# Patient Record
Sex: FEMALE | Race: BLACK OR AFRICAN AMERICAN | Employment: OTHER | URBAN - METROPOLITAN AREA
[De-identification: names, ages, dates, MRNs, and addresses within clinical notes are randomized per-mention and may not be internally consistent; named-entity substitution may affect disease eponyms.]

---

## 2017-10-30 ENCOUNTER — TELEPHONE (OUTPATIENT)
Dept: FAMILY MEDICINE CLINIC | Age: 77
End: 2017-10-30

## 2017-10-30 NOTE — TELEPHONE ENCOUNTER
[address] [facility]    PT was discharged from [facility] for BP  Wants HFU appt, nothing available    PT last seen 5/17/11    Please advise Marylen Basta

## 2017-10-31 ENCOUNTER — TELEPHONE (OUTPATIENT)
Dept: FAMILY MEDICINE CLINIC | Age: 77
End: 2017-10-31

## 2017-10-31 NOTE — TELEPHONE ENCOUNTER
Daughter Katty Biswas is calling for the 3rd time wanting to get her mother in to see Dr. Osvaldo Sosa. Her mother was in the hospital Saturday, they ran some test on her. She was told to get with her doctor ASAP, her blood pressure is high and she is having shortness of breath. She did state if we cant get her in to let her know she will have to find her mother a different doctor. She is really worried about her mother.     Please advise  Kamala Nunez [phone]      Last Office Visit: 5/28/14

## 2017-11-01 ENCOUNTER — OFFICE VISIT (OUTPATIENT)
Dept: FAMILY MEDICINE CLINIC | Age: 77
End: 2017-11-01

## 2017-11-01 VITALS
HEIGHT: 65 IN | DIASTOLIC BLOOD PRESSURE: 80 MMHG | SYSTOLIC BLOOD PRESSURE: 120 MMHG | BODY MASS INDEX: 34.67 KG/M2 | OXYGEN SATURATION: 95 % | HEART RATE: 104 BPM | WEIGHT: 208.1 LBS

## 2017-11-01 DIAGNOSIS — R39.15 URINARY URGENCY: ICD-10-CM

## 2017-11-01 DIAGNOSIS — R06.02 SOB (SHORTNESS OF BREATH): ICD-10-CM

## 2017-11-01 DIAGNOSIS — R35.0 URINARY FREQUENCY: ICD-10-CM

## 2017-11-01 PROCEDURE — 99213 OFFICE O/P EST LOW 20 MIN: CPT | Performed by: FAMILY MEDICINE

## 2017-11-01 RX ORDER — OXYBUTYNIN CHLORIDE 5 MG/1
5 TABLET, EXTENDED RELEASE ORAL DAILY
Qty: 30 TABLET | Refills: 3 | Status: SHIPPED | OUTPATIENT
Start: 2017-11-01 | End: 2020-03-27

## 2017-11-01 ASSESSMENT — ENCOUNTER SYMPTOMS
WHEEZING: 0
ABDOMINAL PAIN: 0
SHORTNESS OF BREATH: 1
HEMOPTYSIS: 0
RHINORRHEA: 0
VOMITING: 0
SPUTUM PRODUCTION: 0
ORTHOPNEA: 0
SORE THROAT: 0
SWOLLEN GLANDS: 0

## 2017-11-01 ASSESSMENT — PATIENT HEALTH QUESTIONNAIRE - PHQ9
2. FEELING DOWN, DEPRESSED OR HOPELESS: 0
SUM OF ALL RESPONSES TO PHQ QUESTIONS 1-9: 0
1. LITTLE INTEREST OR PLEASURE IN DOING THINGS: 0
SUM OF ALL RESPONSES TO PHQ9 QUESTIONS 1 & 2: 0

## 2017-11-03 LAB — URINE CULTURE, ROUTINE: NORMAL

## 2017-12-26 ENCOUNTER — TELEPHONE (OUTPATIENT)
Dept: FAMILY MEDICINE CLINIC | Age: 77
End: 2017-12-26

## 2017-12-26 NOTE — TELEPHONE ENCOUNTER
I   [phone]    Fax [phone]    Requesting H&P, most recent labs  Surgery is on Thursday    PT last seen 11/1/17

## 2020-03-27 ENCOUNTER — OFFICE VISIT (OUTPATIENT)
Dept: FAMILY MEDICINE CLINIC | Age: 80
End: 2020-03-27
Payer: MEDICARE

## 2020-03-27 ENCOUNTER — TELEPHONE (OUTPATIENT)
Dept: FAMILY MEDICINE CLINIC | Age: 80
End: 2020-03-27

## 2020-03-27 VITALS
DIASTOLIC BLOOD PRESSURE: 84 MMHG | TEMPERATURE: 98.7 F | SYSTOLIC BLOOD PRESSURE: 132 MMHG | HEART RATE: 111 BPM | OXYGEN SATURATION: 95 % | BODY MASS INDEX: 33.48 KG/M2 | WEIGHT: 201.2 LBS

## 2020-03-27 DIAGNOSIS — R53.83 FATIGUE, UNSPECIFIED TYPE: ICD-10-CM

## 2020-03-27 DIAGNOSIS — R22.1 NECK MASS: ICD-10-CM

## 2020-03-27 PROBLEM — N30.91 CYSTITIS WITH HEMATURIA: Status: ACTIVE | Noted: 2020-03-27

## 2020-03-27 PROBLEM — E07.9 THYROID MASS: Status: ACTIVE | Noted: 2020-03-27

## 2020-03-27 LAB
A/G RATIO: 1.1 (ref 1.1–2.2)
ALBUMIN SERPL-MCNC: 3.9 G/DL (ref 3.4–5)
ALP BLD-CCNC: 90 U/L (ref 40–129)
ALT SERPL-CCNC: 10 U/L (ref 10–40)
ANION GAP SERPL CALCULATED.3IONS-SCNC: 14 MMOL/L (ref 3–16)
AST SERPL-CCNC: 17 U/L (ref 15–37)
BASOPHILS ABSOLUTE: 0 K/UL (ref 0–0.2)
BASOPHILS RELATIVE PERCENT: 0.6 %
BILIRUB SERPL-MCNC: 0.5 MG/DL (ref 0–1)
BUN BLDV-MCNC: 14 MG/DL (ref 7–20)
CALCIUM SERPL-MCNC: 9.2 MG/DL (ref 8.3–10.6)
CHLORIDE BLD-SCNC: 103 MMOL/L (ref 99–110)
CO2: 22 MMOL/L (ref 21–32)
CREAT SERPL-MCNC: 1.2 MG/DL (ref 0.6–1.2)
EOSINOPHILS ABSOLUTE: 0.2 K/UL (ref 0–0.6)
EOSINOPHILS RELATIVE PERCENT: 3.1 %
GFR AFRICAN AMERICAN: 52
GFR NON-AFRICAN AMERICAN: 43
GLOBULIN: 3.7 G/DL
GLUCOSE BLD-MCNC: 93 MG/DL (ref 70–99)
HCT VFR BLD CALC: 40 % (ref 36–48)
HEMOGLOBIN: 13 G/DL (ref 12–16)
LYMPHOCYTES ABSOLUTE: 2.1 K/UL (ref 1–5.1)
LYMPHOCYTES RELATIVE PERCENT: 36.7 %
MCH RBC QN AUTO: 29.6 PG (ref 26–34)
MCHC RBC AUTO-ENTMCNC: 32.6 G/DL (ref 31–36)
MCV RBC AUTO: 91 FL (ref 80–100)
MONOCYTES ABSOLUTE: 0.6 K/UL (ref 0–1.3)
MONOCYTES RELATIVE PERCENT: 10.4 %
NEUTROPHILS ABSOLUTE: 2.8 K/UL (ref 1.7–7.7)
NEUTROPHILS RELATIVE PERCENT: 49.2 %
PDW BLD-RTO: 13.7 % (ref 12.4–15.4)
PLATELET # BLD: 247 K/UL (ref 135–450)
PMV BLD AUTO: 8.8 FL (ref 5–10.5)
POTASSIUM SERPL-SCNC: 4.4 MMOL/L (ref 3.5–5.1)
RBC # BLD: 4.4 M/UL (ref 4–5.2)
SODIUM BLD-SCNC: 139 MMOL/L (ref 136–145)
T4 FREE: 1.2 NG/DL (ref 0.9–1.8)
TOTAL PROTEIN: 7.6 G/DL (ref 6.4–8.2)
TSH SERPL DL<=0.05 MIU/L-ACNC: 2.8 UIU/ML (ref 0.27–4.2)
WBC # BLD: 5.7 K/UL (ref 4–11)

## 2020-03-27 PROCEDURE — 81000 URINALYSIS NONAUTO W/SCOPE: CPT | Performed by: FAMILY MEDICINE

## 2020-03-27 PROCEDURE — 99214 OFFICE O/P EST MOD 30 MIN: CPT | Performed by: FAMILY MEDICINE

## 2020-03-27 RX ORDER — CEPHALEXIN 500 MG/1
500 CAPSULE ORAL 3 TIMES DAILY
Qty: 21 CAPSULE | Refills: 0 | Status: SHIPPED | OUTPATIENT
Start: 2020-03-27 | End: 2020-04-03

## 2020-03-27 ASSESSMENT — ENCOUNTER SYMPTOMS
COUGH: 0
BACK PAIN: 0
ABDOMINAL DISTENTION: 0
SWOLLEN GLANDS: 0
CHANGE IN BOWEL HABIT: 0
NAUSEA: 0
SORE THROAT: 0
EYE REDNESS: 0
APNEA: 0
CHEST TIGHTNESS: 0
VISUAL CHANGE: 0
PHOTOPHOBIA: 0
VOICE CHANGE: 0
VOMITING: 0
STRIDOR: 0
SINUS PRESSURE: 0
ANAL BLEEDING: 0
COLOR CHANGE: 0
ABDOMINAL PAIN: 0
CONSTIPATION: 0
RECTAL PAIN: 0
FACIAL SWELLING: 0
DIARRHEA: 0
EYE ITCHING: 0
EYE DISCHARGE: 0
RHINORRHEA: 0
TROUBLE SWALLOWING: 0
WHEEZING: 0
SHORTNESS OF BREATH: 0
CHOKING: 0
BLOOD IN STOOL: 0
EYE PAIN: 0

## 2020-03-27 ASSESSMENT — PATIENT HEALTH QUESTIONNAIRE - PHQ9
SUM OF ALL RESPONSES TO PHQ9 QUESTIONS 1 & 2: 0
SUM OF ALL RESPONSES TO PHQ QUESTIONS 1-9: 0
1. LITTLE INTEREST OR PLEASURE IN DOING THINGS: 0
2. FEELING DOWN, DEPRESSED OR HOPELESS: 0
SUM OF ALL RESPONSES TO PHQ QUESTIONS 1-9: 0

## 2020-03-27 ASSESSMENT — LIFESTYLE VARIABLES: TOBACCO_USE: 0

## 2020-03-27 NOTE — PROGRESS NOTES
easily. Psychiatric/Behavioral: Negative for agitation, behavioral problems, confusion, decreased concentration, dysphoric mood, hallucinations, self-injury, sleep disturbance and suicidal ideas. The patient is not nervous/anxious and is not hyperactive. Objective:   Physical Exam  Physical Exam  Vitals signs reviewed. Constitutional:       General: She is not in acute distress. Appearance: She is well-developed. Eyes:      Conjunctiva/sclera: Conjunctivae normal.      Pupils: Pupils are equal, round, and reactive to light. Neck:      Thyroid: No thyromegaly. Vascular: No JVD. Trachea: No tracheal deviation. Comments: Mass L thyroid--no bruit    Cardiovascular:      Rate and Rhythm: Normal rate and regular rhythm. Heart sounds: Normal heart sounds. No murmur. No gallop. Pulmonary:      Effort: Pulmonary effort is normal. No respiratory distress. Breath sounds: Normal breath sounds. No stridor. No wheezing or rales. Chest:      Chest wall: No tenderness. Abdominal:      General: Bowel sounds are normal. There is no distension. Palpations: Abdomen is soft. There is no mass. Tenderness: There is abdominal tenderness (over bladder). Musculoskeletal:         General: No tenderness. Lymphadenopathy:      Cervical: No cervical adenopathy. Skin:     General: Skin is warm and dry. Coloration: Skin is not pale. Findings: No erythema or rash. Neurological:      Mental Status: She is alert and oriented to person, place, and time. Cranial Nerves: No cranial nerve deficit. Motor: No abnormal muscle tone. Coordination: Coordination normal.      Deep Tendon Reflexes: Reflexes normal.   Psychiatric:         Behavior: Behavior normal.         Thought Content: Thought content normal.         Judgment: Judgment normal.       Urine dipstick shows positive for WBC's and positive for RBC's.   Micro exam: 30 WBC's per HPF and 30 RBC's per

## 2020-03-29 LAB — URINE CULTURE, ROUTINE: NORMAL

## 2020-04-02 ENCOUNTER — TELEPHONE (OUTPATIENT)
Dept: FAMILY MEDICINE CLINIC | Age: 80
End: 2020-04-02

## 2020-04-06 NOTE — TELEPHONE ENCOUNTER
Pt knows she is supposed to have ultrasound but said she is supposed to have a differen abx sent into pharmacy as well

## 2020-04-09 ENCOUNTER — TELEPHONE (OUTPATIENT)
Dept: FAMILY MEDICINE CLINIC | Age: 80
End: 2020-04-09

## 2020-04-09 NOTE — TELEPHONE ENCOUNTER
Dr. Navi Glaser received call from central scheduling     want to know if ultra sound is urgent or not  , need approval  or can reachout to central scheduling [phone]

## 2020-04-16 ENCOUNTER — TELEPHONE (OUTPATIENT)
Dept: FAMILY MEDICINE CLINIC | Age: 80
End: 2020-04-16

## 2020-04-21 ENCOUNTER — HOSPITAL ENCOUNTER (OUTPATIENT)
Dept: ULTRASOUND IMAGING | Age: 80
Discharge: HOME | End: 2020-04-21
Payer: MEDICARE

## 2020-04-21 PROBLEM — K80.20 ASYMPTOMATIC GALLSTONES: Status: ACTIVE | Noted: 2020-04-21

## 2020-04-21 PROBLEM — N20.0 KIDNEY STONES: Status: ACTIVE | Noted: 2020-04-21

## 2020-04-21 PROCEDURE — 76830 TRANSVAGINAL US NON-OB: CPT

## 2020-04-21 PROCEDURE — 76856 US EXAM PELVIC COMPLETE: CPT

## 2020-04-21 PROCEDURE — 76700 US EXAM ABDOM COMPLETE: CPT

## 2020-12-11 ENCOUNTER — NURSE TRIAGE (OUTPATIENT)
Dept: OTHER | Facility: CLINIC | Age: 80
End: 2020-12-11

## 2020-12-11 NOTE — TELEPHONE ENCOUNTER
Called and notified pt.
Please call and refer to Dr Preeti Nava
normal activities, abdomen soft and not tender to touch     - MODERATE (4-7): interferes with normal activities or awakens from sleep, tender to touch     - SEVERE (8-10): excruciating pain, doubled over, unable to do any normal activities       No    5. BLOOD THINNERS: \"Do you take any blood thinners? \" (e.g., Coumadin/warfarin, Pradaxa/dabigatran, aspirin)      Baby asa and full strength occasionally    6. HORMONES: Helen Dominguezlow you taking any hormone medications, prescription or OTC? \" (e.g., birth control pills, estrogen)      No    7. CAUSE: \"What do you think is causing the bleeding? \" (e.g., recent gyn surgery, recent gyn procedure; known bleeding disorder, uterine cancer)        No, unknown    8. HEMODYNAMIC STATUS: \"Are you weak or feeling lightheaded? \" If so, ask: \"Can you stand and walk normally? \"        Lightheaded occasionally. 9. OTHER SYMPTOMS: \"What other symptoms are you having with the bleeding? \" (e.g., back pain, burning with urination, fever)      discharge    Protocols used: VAGINAL BLEEDING - POSTMENOPAUSAL-ADULT-

## 2021-01-12 ENCOUNTER — OFFICE VISIT (OUTPATIENT)
Dept: GYNECOLOGY | Age: 81
End: 2021-01-12
Payer: MEDICARE

## 2021-01-12 VITALS
BODY MASS INDEX: 28.99 KG/M2 | HEART RATE: 103 BPM | DIASTOLIC BLOOD PRESSURE: 83 MMHG | HEIGHT: 65 IN | SYSTOLIC BLOOD PRESSURE: 130 MMHG | TEMPERATURE: 97.9 F | WEIGHT: 174 LBS | RESPIRATION RATE: 16 BRPM

## 2021-01-12 DIAGNOSIS — Z01.419 WELL WOMAN EXAM: Primary | ICD-10-CM

## 2021-01-12 DIAGNOSIS — R31.9 HEMATURIA, UNSPECIFIED TYPE: ICD-10-CM

## 2021-01-12 DIAGNOSIS — N95.0 POSTMENOPAUSAL BLEEDING: ICD-10-CM

## 2021-01-12 DIAGNOSIS — N76.0 ACUTE VAGINITIS: ICD-10-CM

## 2021-01-12 PROCEDURE — G0101 CA SCREEN;PELVIC/BREAST EXAM: HCPCS | Performed by: OBSTETRICS & GYNECOLOGY

## 2021-01-12 PROCEDURE — 58100 BIOPSY OF UTERUS LINING: CPT | Performed by: OBSTETRICS & GYNECOLOGY

## 2021-01-12 RX ORDER — METRONIDAZOLE 500 MG/1
500 TABLET ORAL 2 TIMES DAILY
Qty: 14 TABLET | Refills: 0 | Status: SHIPPED | OUTPATIENT
Start: 2021-01-12 | End: 2021-01-19

## 2021-01-12 ASSESSMENT — ENCOUNTER SYMPTOMS
SHORTNESS OF BREATH: 0
APNEA: 0
VOMITING: 0
DIARRHEA: 0
BLOOD IN STOOL: 0
CONSTIPATION: 0
ABDOMINAL DISTENTION: 0
COLOR CHANGE: 0
PHOTOPHOBIA: 0
NAUSEA: 0
CHEST TIGHTNESS: 0
COUGH: 0
WHEEZING: 0
ABDOMINAL PAIN: 0
SORE THROAT: 0
TROUBLE SWALLOWING: 0
BACK PAIN: 0
RECTAL PAIN: 0
ANAL BLEEDING: 0

## 2021-01-12 NOTE — PROGRESS NOTES
Annual GYN Visit    Cheryl T Alexis  Date ofBirth:  1940    Date of Service:  2021    Chief Complaint:   Maritza Garcia is a 78 y.o.  female who presents for routine annual gynecologic visit and postmenopausal bleeding x 5 months     HPI:  Patient is postmenopausal- she reports history of postmenopausal bleeding x 5 months. Bleeding is daily and about one pad/day - light pink to blood tinged, she reports 25 lb weight loss recently. Pelvic ultrasound done 2020 ->   FINDINGS:       The uterus measures 11.4 x 8.9 centimeters. The endometrium is obscured from view. There is a partially calcified fundal fibroid measuring 5.4 x 4.8 cm.       Neither ovary are visualized.           Impression       1. Limited pelvic ultrasound. Specifically, the endometrium nor ovaries were able to be sonographically visualized. Sonographer notes vaginal bleeding.       2. Presence of uterine fibroid. Patient also has by history blood in urine, denies symptoms of UTI, she is not sexually active   Health Maintenance   Topic Date Due    DTaP/Tdap/Td vaccine (1 - Tdap) 1957    Shingles Vaccine (1 of 2) 1988    DEXA (modify frequency per FRAX score)  1993    Pneumococcal 65+ years Vaccine (1 of 1 - PPSV23) 2003    Annual Wellness Visit (AWV)  2020    Flu vaccine (1) 2020    Hepatitis A vaccine  Aged Out    Hepatitis B vaccine  Aged Out    Hib vaccine  Aged Out    Meningococcal (ACWY) vaccine  Aged Out       Past Medical History:   Diagnosis Date    Asymptomatic gallstones 2020    CVA (cerebral infarction)     neg carotids, nl echocardiogram    Hyperlipidemia     Hypertension     Pulmonary nodule      Past Surgical History:   Procedure Laterality Date    KNEE ARTHROSCOPY       OB History   No obstetric history on file.      Social History     Socioeconomic History    Marital status:      Spouse name: Not on file    Number of children: Not on file    Years of education: Not on file    Highest education level: Not on file   Occupational History    Not on file   Social Needs    Financial resource strain: Not on file    Food insecurity     Worry: Not on file     Inability: Not on file    Transportation needs     Medical: Not on file     Non-medical: Not on file   Tobacco Use    Smoking status: Never Smoker    Smokeless tobacco: Never Used   Substance and Sexual Activity    Alcohol use: No    Drug use: No    Sexual activity: Not Currently     Partners: Female   Lifestyle    Physical activity     Days per week: Not on file     Minutes per session: Not on file    Stress: Not on file   Relationships    Social connections     Talks on phone: Not on file     Gets together: Not on file     Attends Sikhism service: Not on file     Active member of club or organization: Not on file     Attends meetings of clubs or organizations: Not on file     Relationship status: Not on file    Intimate partner violence     Fear of current or ex partner: Not on file     Emotionally abused: Not on file     Physically abused: Not on file     Forced sexual activity: Not on file   Other Topics Concern    Not on file   Social History Narrative    Not on file     No Known Allergies  Outpatient Medications Marked as Taking for the 1/12/21 encounter (Office Visit) with Cait Reddy MD   Medication Sig Dispense Refill    metroNIDAZOLE (FLAGYL) 500 MG tablet Take 1 tablet by mouth 2 times daily for 7 days 14 tablet 0    aspirin 81 MG chewable tablet Take 81 mg by mouth daily. Family History   Problem Relation Age of Onset    High Blood Pressure Father     Stroke Father     Stroke Sister     Cancer Brother         lung    Cancer Brother         colon         Review of Systems:  Review of Systems   Constitutional: Negative for appetite change, chills, fatigue, fever and unexpected weight change.    HENT: Negative for ear pain, hearing loss, mouth sores, sore throat and trouble swallowing. Eyes: Negative for photophobia and visual disturbance. Respiratory: Negative for apnea, cough, chest tightness, shortness of breath and wheezing. Cardiovascular: Negative for chest pain, palpitations and leg swelling. Gastrointestinal: Negative for abdominal distention, abdominal pain, anal bleeding, blood in stool, constipation, diarrhea, nausea, rectal pain and vomiting. Endocrine: Negative for cold intolerance, heat intolerance, polydipsia, polyphagia and polyuria. Genitourinary: Positive for vaginal bleeding and vaginal discharge. Negative for difficulty urinating, dyspareunia, dysuria, enuresis, frequency, genital sores, hematuria, menstrual problem, pelvic pain, urgency and vaginal pain. Musculoskeletal: Negative for arthralgias, back pain, joint swelling and myalgias. Skin: Negative for color change and rash. Neurological: Negative for dizziness, seizures, syncope, light-headedness and headaches. Psychiatric/Behavioral: Negative for agitation, behavioral problems, confusion, decreased concentration, dysphoric mood, hallucinations, self-injury, sleep disturbance and suicidal ideas. The patient is not nervous/anxious and is not hyperactive. Physical Exam:  /83 (Site: Right Upper Arm, Position: Sitting, Cuff Size: Medium Adult)   Pulse 103   Temp 97.9 °F (36.6 °C) (Temporal)   Resp 16   Ht 5' 5\" (1.651 m)   Wt 174 lb (78.9 kg)   BMI 28.96 kg/m²   BP Readings from Last 3 Encounters:   01/12/21 130/83   03/27/20 132/84   11/01/17 120/80     Body mass index is 28.96 kg/m². Physical Exam  Constitutional:       General: She is not in acute distress. Appearance: She is well-developed. HENT:      Head: Normocephalic and atraumatic. Mouth/Throat:      Pharynx: No oropharyngeal exudate. Eyes:      General: No scleral icterus. Right eye: No discharge. Left eye: No discharge.       Conjunctiva/sclera: Conjunctivae normal.   Neck:      Thyroid: No thyromegaly. Cardiovascular:      Rate and Rhythm: Normal rate and regular rhythm. Heart sounds: Normal heart sounds. Pulmonary:      Effort: Pulmonary effort is normal.      Breath sounds: Normal breath sounds. Abdominal:      General: Bowel sounds are normal. There is no distension. Palpations: Abdomen is soft. There is no mass. Tenderness: There is no abdominal tenderness. There is no guarding or rebound. Genitourinary:     Labia:         Right: No rash, tenderness, lesion or injury. Left: No rash, tenderness, lesion or injury. Vagina: No signs of injury and foreign body. Vaginal discharge (10 cc of blood tinged yellow discharge present ) and bleeding present. No erythema or tenderness. Cervix: No cervical motion tenderness, discharge or friability. Uterus: Enlarged. Not deviated, not fixed and not tender. Adnexa:         Right: No mass, tenderness or fullness. Left: No mass, tenderness or fullness. Rectum: No mass or external hemorrhoid. Skin:     General: Skin is warm. Coloration: Skin is not pale. Findings: No erythema or rash. Neurological:      Mental Status: She is alert. Psychiatric:         Behavior: Behavior normal. Behavior is cooperative. Thought Content: Thought content normal.         Judgment: Judgment normal.     Procedure note: endometrial biopsy  Indications for procedure reviewed. Risks and benefits of procedure discussed. Written and informed consent reviewed and signed. Patient was positioned in the dorsal lithotomy position. The speculum was then placed vaginally and the cervix was prepped with betadine x 3 . A single tooth tenaculum was then applied to the anterior lip of the cervix. A endometrial pipelle was then inserted into the patients uterus for 3 passes. Cell/specimen collection was found to be adequate.   The single tooth tenaculum was then removed from the anterior lip of the cervix and hemostasis was assured. The patient tolerated the procedure well. The uterus sounded to 7 cm during the procedure. Aftercare was discussed and explained to the patient prior to leaving the office. Assessment/Plan:  1. Well woman exam  - PAP SMEAR  Self breast exam discussed and encouraged  Diet and exercise discussed  Discussed daily multi-vitamin and adequate calcium and vitamin D in diet    2. Postmenopausal bleeding  Endometrial biopsy done in office today  - PAP SMEAR  - US PELVIS COMPLETE; Future  - Surgical Pathology  - 30344 - OK BIOPSY OF UTERUS LINING    3. Acute vaginitis  - metroNIDAZOLE (FLAGYL) 500 MG tablet; Take 1 tablet by mouth 2 times daily for 7 days  Dispense: 14 tablet; Refill: 0    4. Hematuria, unspecified type  UA - moderate blood and small leukocyte estrace   - Culture, Urine      Return in about 1 week (around 1/19/2021).

## 2021-01-13 ENCOUNTER — TELEPHONE (OUTPATIENT)
Dept: FAMILY MEDICINE CLINIC | Age: 81
End: 2021-01-13

## 2021-01-13 LAB — URINE CULTURE, ROUTINE: NORMAL

## 2021-01-22 ENCOUNTER — OFFICE VISIT (OUTPATIENT)
Dept: GYNECOLOGY | Age: 81
End: 2021-01-22
Payer: MEDICARE

## 2021-01-22 VITALS
TEMPERATURE: 97.6 F | SYSTOLIC BLOOD PRESSURE: 102 MMHG | DIASTOLIC BLOOD PRESSURE: 62 MMHG | HEART RATE: 62 BPM | WEIGHT: 174.5 LBS | HEIGHT: 65 IN | OXYGEN SATURATION: 98 % | RESPIRATION RATE: 16 BRPM | BODY MASS INDEX: 29.07 KG/M2

## 2021-01-22 DIAGNOSIS — N95.0 POSTMENOPAUSAL BLEEDING: Primary | ICD-10-CM

## 2021-01-22 DIAGNOSIS — N71.9 ENDOMETRITIS: ICD-10-CM

## 2021-01-22 DIAGNOSIS — R19.00 PELVIC MASS: ICD-10-CM

## 2021-01-22 DIAGNOSIS — N89.8 VAGINAL DISCHARGE: ICD-10-CM

## 2021-01-22 LAB
BACTERIA WET PREP: NORMAL
CLUE CELLS: NORMAL
EPITHELIAL CELLS WET PREP: NORMAL
RBC WET PREP: NORMAL
SOURCE WET PREP: NORMAL
TRICHOMONAS PREP: NORMAL
WBC WET PREP: NORMAL
YEAST WET PREP: NORMAL

## 2021-01-22 PROCEDURE — 99213 OFFICE O/P EST LOW 20 MIN: CPT | Performed by: OBSTETRICS & GYNECOLOGY

## 2021-01-22 RX ORDER — DOXYCYCLINE HYCLATE 100 MG
100 TABLET ORAL 2 TIMES DAILY
Qty: 14 TABLET | Refills: 0 | Status: SHIPPED | OUTPATIENT
Start: 2021-01-22 | End: 2021-01-29

## 2021-01-22 ASSESSMENT — ENCOUNTER SYMPTOMS
TROUBLE SWALLOWING: 0
ABDOMINAL DISTENTION: 0
SHORTNESS OF BREATH: 0
PHOTOPHOBIA: 0
BACK PAIN: 0
APNEA: 0
SORE THROAT: 0
CHEST TIGHTNESS: 0
CONSTIPATION: 0
DIARRHEA: 0
RECTAL PAIN: 0
BLOOD IN STOOL: 0
COUGH: 0
VOMITING: 0
WHEEZING: 0
NAUSEA: 0
COLOR CHANGE: 0
ABDOMINAL PAIN: 0
ANAL BLEEDING: 0

## 2021-01-22 NOTE — PROGRESS NOTES
Annual GYN Visit    Cheryl CAITLYN Espinoza  Date ofBirth:  1940    Date of Service:  2021    Chief Complaint:   Guille Gonzalez is a 78 y.o.   female who presents for follow-up on postmenopausal bleeding     HPI:  Patient is postmenopausal-  She was initially seen in the office on 2020 and HPI that visit -> she reports history of postmenopausal bleeding x 5 months. Bleeding is daily and about one pad/day - light pink to blood tinged, she reports 25 lb weight loss recently.      Pelvic ultrasound done 2020 ->   FINDINGS:       The uterus measures 11.4 x 8.9 centimeters. The endometrium is obscured from view. There is a partially calcified fundal fibroid measuring 5.4 x 4.8 cm.       Neither ovary are visualized.           Impression       1. Limited pelvic ultrasound. Specifically, the endometrium nor ovaries were able to be sonographically visualized. Sonographer notes vaginal bleeding.       2. Presence of uterine fibroid.      Work-up done   Pap smear - unsatisfactory   ENB DONE ->   FINAL DIAGNOSIS:     Endometrial biopsy:   - Marked chronic inflammation with predominantly plasma cells.  See   comment. COMMENT:  The epithelial cells are that of endocervical type.  There is   associated dense plasma cell aggregates.  This may represent chronic   endometritis if the specimen was truly from endometrial origin.    Otherwise it may represent marked chronic cervicitis.        Health Maintenance   Topic Date Due    COVID-19 Vaccine (1 of 2) 1954    DTaP/Tdap/Td vaccine (1 - Tdap) 1957    Shingles Vaccine (1 of 2) 1988    DEXA (modify frequency per FRAX score)  1993    Pneumococcal 65+ years Vaccine (1 of 1 - PPSV23) 2003    Annual Wellness Visit (AWV)  2020    Flu vaccine (1) 2020    Hepatitis A vaccine  Aged Out    Hepatitis B vaccine  Aged Out    Hib vaccine  Aged Out    Meningococcal (ACWY) vaccine  Aged Out       Past Medical History: Diagnosis Date    Asymptomatic gallstones 4/21/2020    CVA (cerebral infarction) 2007    neg carotids, nl echocardiogram    Hyperlipidemia     Hypertension     Pulmonary nodule      Past Surgical History:   Procedure Laterality Date    KNEE ARTHROSCOPY       OB History   No obstetric history on file.      Social History     Socioeconomic History    Marital status:      Spouse name: Not on file    Number of children: Not on file    Years of education: Not on file    Highest education level: Not on file   Occupational History    Not on file   Social Needs    Financial resource strain: Not on file    Food insecurity     Worry: Not on file     Inability: Not on file    Transportation needs     Medical: Not on file     Non-medical: Not on file   Tobacco Use    Smoking status: Never Smoker    Smokeless tobacco: Never Used   Substance and Sexual Activity    Alcohol use: No    Drug use: No    Sexual activity: Not Currently     Partners: Female   Lifestyle    Physical activity     Days per week: Not on file     Minutes per session: Not on file    Stress: Not on file   Relationships    Social connections     Talks on phone: Not on file     Gets together: Not on file     Attends [employer]ist service: Not on file     Active member of club or organization: Not on file     Attends meetings of clubs or organizations: Not on file     Relationship status: Not on file    Intimate partner violence     Fear of current or ex partner: Not on file     Emotionally abused: Not on file     Physically abused: Not on file     Forced sexual activity: Not on file   Other Topics Concern    Not on file   Social History Narrative    Not on file     No Known Allergies  Outpatient Medications Marked as Taking for the 1/22/21 encounter (Office Visit) with Redd Ta MD   Medication Sig Dispense Refill    doxycycline hyclate (VIBRA-TABS) 100 MG tablet Take 1 tablet by mouth 2 times daily for 7 days 14 tablet 0 Body mass index is 29.04 kg/m². Physical Exam  Constitutional:       General: She is not in acute distress. Appearance: She is well-developed. HENT:      Head: Normocephalic and atraumatic. Mouth/Throat:      Pharynx: No oropharyngeal exudate. Eyes:      General: No scleral icterus. Right eye: No discharge. Left eye: No discharge. Conjunctiva/sclera: Conjunctivae normal.   Neck:      Musculoskeletal: Normal range of motion and neck supple. Thyroid: No thyromegaly. Cardiovascular:      Rate and Rhythm: Normal rate and regular rhythm. Heart sounds: Normal heart sounds. Pulmonary:      Effort: Pulmonary effort is normal. No respiratory distress. Breath sounds: Normal breath sounds. Abdominal:      General: Bowel sounds are normal. There is no distension. Palpations: Abdomen is soft. There is no mass. Tenderness: There is no abdominal tenderness. There is no guarding or rebound. Genitourinary:     Labia:         Right: No rash, tenderness, lesion or injury. Left: No rash, tenderness, lesion or injury. Vagina: No signs of injury and foreign body. Vaginal discharge (yellow pus discharge present mixed with blood ) present. No erythema or tenderness. Cervix: No cervical motion tenderness, discharge or friability. Uterus: Enlarged. Not deviated, not fixed and not tender. Adnexa:         Right: Mass present. No tenderness or fullness. Left: No mass, tenderness or fullness. Rectum: No mass or external hemorrhoid. Comments: Cul de sac mass palpated   Skin:     General: Skin is warm. Coloration: Skin is not pale. Findings: No erythema or rash. Neurological:      Mental Status: She is alert and oriented to person, place, and time. Psychiatric:         Behavior: Behavior normal. Behavior is cooperative. Thought Content:  Thought content normal.         Judgment: Judgment normal. Assessment/Plan:  1. Postmenopausal bleeding  - CT ABDOMEN PELVIS W IV CONTRAST Additional Contrast? Oral; Future    2. Endometritis  - doxycycline hyclate (VIBRA-TABS) 100 MG tablet; Take 1 tablet by mouth 2 times daily for 7 days  Dispense: 14 tablet; Refill: 0    3. Vaginal discharge  - C.trachomatis N.gonorrhoeae DNA  - Wet prep, genital    4. Pelvic mass  - CT ABDOMEN PELVIS W IV CONTRAST Additional Contrast? Oral; Future      Return in about 1 week (around 1/29/2021).

## 2021-01-25 ENCOUNTER — TELEPHONE (OUTPATIENT)
Dept: GYNECOLOGY | Age: 81
End: 2021-01-25

## 2021-01-25 DIAGNOSIS — R19.00 PELVIC MASS: Primary | ICD-10-CM

## 2021-01-27 LAB
C TRACH DNA GENITAL QL NAA+PROBE: NORMAL
N. GONORRHOEAE DNA: NORMAL

## 2021-02-01 ENCOUNTER — HOSPITAL ENCOUNTER (OUTPATIENT)
Dept: CT IMAGING | Age: 81
Discharge: HOME | End: 2021-02-01
Payer: MEDICARE

## 2021-02-01 DIAGNOSIS — N95.0 POSTMENOPAUSAL BLEEDING: ICD-10-CM

## 2021-02-01 DIAGNOSIS — R19.00 PELVIC MASS: ICD-10-CM

## 2021-02-01 LAB
GFR AFRICAN AMERICAN: 47
GFR NON-AFRICAN AMERICAN: 39
PERFORMED ON: ABNORMAL
POC CREATININE: 1.3 MG/DL (ref 0.6–1.2)
POC SAMPLE TYPE: ABNORMAL

## 2021-02-01 PROCEDURE — 82565 ASSAY OF CREATININE: CPT

## 2021-02-01 PROCEDURE — 6360000004 HC RX CONTRAST MEDICATION: Performed by: OBSTETRICS & GYNECOLOGY

## 2021-02-01 PROCEDURE — 74177 CT ABD & PELVIS W/CONTRAST: CPT

## 2021-02-01 RX ADMIN — IOHEXOL 50 ML: 240 INJECTION, SOLUTION INTRATHECAL; INTRAVASCULAR; INTRAVENOUS; ORAL at 13:57

## 2021-02-01 RX ADMIN — IOPAMIDOL 80 ML: 755 INJECTION, SOLUTION INTRAVENOUS at 13:56

## 2021-02-03 ENCOUNTER — TELEPHONE (OUTPATIENT)
Dept: GYNECOLOGY | Age: 81
End: 2021-02-03

## 2021-02-09 ENCOUNTER — TELEPHONE (OUTPATIENT)
Dept: ADMINISTRATIVE | Age: 81
End: 2021-02-09

## 2021-02-09 NOTE — TELEPHONE ENCOUNTER
Pt is callining to cancel appt 2/09 @1030. Due to inclement weather. Pt would like to be rescheulde for Friday 2/12. Please call PT back to get rescheduled.

## 2021-02-12 ENCOUNTER — OFFICE VISIT (OUTPATIENT)
Dept: GYNECOLOGY | Age: 81
End: 2021-02-12
Payer: MEDICARE

## 2021-02-12 VITALS
DIASTOLIC BLOOD PRESSURE: 87 MMHG | TEMPERATURE: 97.5 F | WEIGHT: 177 LBS | HEART RATE: 95 BPM | RESPIRATION RATE: 16 BRPM | BODY MASS INDEX: 29.49 KG/M2 | HEIGHT: 65 IN | SYSTOLIC BLOOD PRESSURE: 134 MMHG

## 2021-02-12 DIAGNOSIS — D25.9 UTERINE LEIOMYOMA, UNSPECIFIED LOCATION: Primary | ICD-10-CM

## 2021-02-12 DIAGNOSIS — N95.0 POSTMENOPAUSAL BLEEDING: ICD-10-CM

## 2021-02-12 DIAGNOSIS — N89.8 VAGINAL DISCHARGE: ICD-10-CM

## 2021-02-12 DIAGNOSIS — R79.89 ELEVATED SERUM CREATININE: ICD-10-CM

## 2021-02-12 PROCEDURE — 99214 OFFICE O/P EST MOD 30 MIN: CPT | Performed by: OBSTETRICS & GYNECOLOGY

## 2021-02-12 RX ORDER — METRONIDAZOLE 500 MG/1
500 TABLET ORAL 2 TIMES DAILY
Qty: 14 TABLET | Refills: 0 | Status: SHIPPED | OUTPATIENT
Start: 2021-02-12 | End: 2021-02-19

## 2021-02-12 ASSESSMENT — ENCOUNTER SYMPTOMS
TROUBLE SWALLOWING: 0
WHEEZING: 0
APNEA: 0
ABDOMINAL PAIN: 0
ABDOMINAL DISTENTION: 0
CHEST TIGHTNESS: 0
ANAL BLEEDING: 0
CONSTIPATION: 0
BACK PAIN: 0
BLOOD IN STOOL: 0
SHORTNESS OF BREATH: 0
DIARRHEA: 0
COLOR CHANGE: 0
RECTAL PAIN: 0
COUGH: 0
SORE THROAT: 0
NAUSEA: 0
PHOTOPHOBIA: 0
VOMITING: 0

## 2021-02-12 NOTE — PROGRESS NOTES
Annual GYN Visit    Cheryl CAITLYN Alexis  Date ofBirth:  1940    Date of Service:  2021    Chief Complaint:   Edson Torres is a 78 y.o.   female who presents for routine annual gynecologic visit. HPI:  Patient is postmenopausal-   She was initially seen in the office on 2020 and HPI that visit -> she reports history of postmenopausal bleeding x 5 months. Bleeding wasdaily and about one pad/day - light pink to blood tinged, she reports 25 lb weight loss recently. Since her last visit she did not have vaginal bleeding until today- light spotting today      Pelvic ultrasound done 2020 ->   FINDINGS:       The uterus measures 11.4 x 8.9 centimeters. The endometrium is obscured from view. There is a partially calcified fundal fibroid measuring 5.4 x 4.8 cm.       Neither ovary are visualized.           Impression       1. Limited pelvic ultrasound. Specifically, the endometrium nor ovaries were able to be sonographically visualized. Sonographer notes vaginal bleeding.       2. Presence of uterine fibroid.      Work-up done   Pap smear - unsatisfactory   ENB DONE ->   FINAL DIAGNOSIS:     Endometrial biopsy:   - Marked chronic inflammation with predominantly plasma cells.  See   comment. COMMENT:  The epithelial cells are that of endocervical type.  There is   associated dense plasma cell aggregates.  This may represent chronic   endometritis if the specimen was truly from endometrial origin. Otherwise it may represent marked chronic cervicitis.      CT scan abdomen and pelvis done 2021  ->   Impression       1. Enlarged uterus with multiple large calcified fibroids. There is gas associated with one of the fibroids and necrosis or infection is not excluded. 2. Fat-containing umbilical hernia. 3. Cardiomegaly. 4. No acute abdominal or pelvic and normality identified.    5. Diverticulosis without diverticulitis.             Health Maintenance   Topic Date Due    COVID-19 Vaccine (1 of 2) 09/26/1954    DTaP/Tdap/Td vaccine (1 - Tdap) 09/26/1957    Shingles Vaccine (1 of 2) 09/26/1988    DEXA (modify frequency per FRAX score)  09/26/1993    Pneumococcal 65+ years Vaccine (1 of 1 - PPSV23) 09/26/2003    Annual Wellness Visit (AWV)  04/21/2020    Flu vaccine (1) 09/01/2020    Hepatitis A vaccine  Aged Out    Hepatitis B vaccine  Aged Out    Hib vaccine  Aged Out    Meningococcal (ACWY) vaccine  Aged Out       Past Medical History:   Diagnosis Date    Asymptomatic gallstones 4/21/2020    CVA (cerebral infarction) 2007    neg carotids, nl echocardiogram    Hyperlipidemia     Hypertension     Pulmonary nodule      Past Surgical History:   Procedure Laterality Date    KNEE ARTHROSCOPY       OB History   No obstetric history on file.      Social History     Socioeconomic History    Marital status:      Spouse name: Not on file    Number of children: Not on file    Years of education: Not on file    Highest education level: Not on file   Occupational History    Not on file   Social Needs    Financial resource strain: Not on file    Food insecurity     Worry: Not on file     Inability: Not on file    Transportation needs     Medical: Not on file     Non-medical: Not on file   Tobacco Use    Smoking status: Never Smoker    Smokeless tobacco: Never Used   Substance and Sexual Activity    Alcohol use: No    Drug use: No    Sexual activity: Not Currently     Partners: Female   Lifestyle    Physical activity     Days per week: Not on file     Minutes per session: Not on file    Stress: Not on file   Relationships    Social connections     Talks on phone: Not on file     Gets together: Not on file     Attends Hinduism service: Not on file     Active member of club or organization: Not on file     Attends meetings of clubs or organizations: Not on file     Relationship status: Not on file    Intimate partner violence     Fear of current or ex partner: Not on file     Emotionally abused: Not on file     Physically abused: Not on file     Forced sexual activity: Not on file   Other Topics Concern    Not on file   Social History Narrative    Not on file     No Known Allergies  Outpatient Medications Marked as Taking for the 2/12/21 encounter (Office Visit) with Jeff Robison MD   Medication Sig Dispense Refill    metroNIDAZOLE (FLAGYL) 500 MG tablet Take 1 tablet by mouth 2 times daily for 7 days Do not consume alcohol while taking medication. 14 tablet 0    aspirin 81 MG chewable tablet Take 81 mg by mouth daily. Family History   Problem Relation Age of Onset    High Blood Pressure Father     Stroke Father     Stroke Sister     Cancer Brother         lung    Cancer Brother         colon         Review of Systems:  Review of Systems   Constitutional: Negative for appetite change, chills, fatigue, fever and unexpected weight change. HENT: Negative for ear pain, hearing loss, mouth sores, sore throat and trouble swallowing. Eyes: Negative for photophobia and visual disturbance. Respiratory: Negative for apnea, cough, chest tightness, shortness of breath and wheezing. Cardiovascular: Negative for chest pain, palpitations and leg swelling. Gastrointestinal: Negative for abdominal distention, abdominal pain, anal bleeding, blood in stool, constipation, diarrhea, nausea, rectal pain and vomiting. Endocrine: Negative for cold intolerance, heat intolerance, polydipsia, polyphagia and polyuria. Genitourinary: Positive for vaginal bleeding. Negative for difficulty urinating, dyspareunia, dysuria, enuresis, frequency, genital sores, hematuria, menstrual problem, pelvic pain, urgency, vaginal discharge and vaginal pain. Musculoskeletal: Negative for arthralgias, back pain, joint swelling and myalgias. Skin: Negative for color change and rash. Neurological: Negative for dizziness, seizures, syncope, light-headedness and headaches. Psychiatric/Behavioral: Negative for agitation, behavioral problems, confusion, decreased concentration, dysphoric mood, hallucinations, self-injury, sleep disturbance and suicidal ideas. The patient is not nervous/anxious and is not hyperactive. Physical Exam:  /87 (Site: Left Upper Arm, Position: Sitting, Cuff Size: Large Adult)   Pulse 95   Temp 97.5 °F (36.4 °C) (Temporal)   Resp 16   Ht 5' 5\" (1.651 m)   Wt 177 lb (80.3 kg)   BMI 29.45 kg/m²   BP Readings from Last 3 Encounters:   02/12/21 134/87   01/22/21 102/62   01/12/21 130/83     Body mass index is 29.45 kg/m². Physical Exam  Constitutional:       General: She is not in acute distress. Appearance: She is well-developed. HENT:      Head: Normocephalic and atraumatic. Mouth/Throat:      Pharynx: No oropharyngeal exudate. Eyes:      General: No scleral icterus. Right eye: No discharge. Left eye: No discharge. Conjunctiva/sclera: Conjunctivae normal.   Neck:      Thyroid: No thyromegaly. Cardiovascular:      Rate and Rhythm: Normal rate and regular rhythm. Heart sounds: Normal heart sounds. Pulmonary:      Effort: Pulmonary effort is normal.      Breath sounds: Normal breath sounds. Abdominal:      General: Bowel sounds are normal. There is no distension. Palpations: Abdomen is soft. There is no mass. Tenderness: There is no abdominal tenderness. There is no guarding or rebound. Genitourinary:     Labia:         Right: No rash, tenderness, lesion or injury. Left: No rash, tenderness, lesion or injury. Vagina: No signs of injury and foreign body. Vaginal discharge (yellow pus tinged discharge ) present. No erythema or tenderness. Cervix: No cervical motion tenderness, discharge or friability. Uterus: Enlarged. Not deviated, not fixed and not tender. Adnexa:         Right: No mass, tenderness or fullness. Left: No mass, tenderness or fullness. Rectum: No mass or external hemorrhoid. Skin:     General: Skin is warm. Coloration: Skin is not pale. Findings: No erythema or rash. Neurological:      Mental Status: She is alert. Psychiatric:         Behavior: Behavior normal. Behavior is cooperative. Thought Content: Thought content normal.         Judgment: Judgment normal.           Assessment/Plan:  1. Uterine leiomyoma, unspecified location  - MRI PELVIS W WO CONTRAST; Future    2. Postmenopausal bleeding  Will consult gyn oncology   - MRI PELVIS W WO CONTRAST; Future    3. Elevated serum creatinine  - BASIC METABOLIC PANEL; Future    4. Vaginal discharge  - metroNIDAZOLE (FLAGYL) 500 MG tablet; Take 1 tablet by mouth 2 times daily for 7 days Do not consume alcohol while taking medication. Dispense: 14 tablet; Refill: 0      Return in about 1 week (around 2/19/2021).

## 2021-03-07 ENCOUNTER — HOSPITAL ENCOUNTER (OUTPATIENT)
Dept: MRI IMAGING | Age: 81
Discharge: HOME | End: 2021-03-07
Payer: MEDICARE

## 2021-03-07 DIAGNOSIS — D25.9 UTERINE LEIOMYOMA, UNSPECIFIED LOCATION: ICD-10-CM

## 2021-03-07 DIAGNOSIS — N95.0 POSTMENOPAUSAL BLEEDING: ICD-10-CM

## 2021-03-07 PROCEDURE — 72197 MRI PELVIS W/O & W/DYE: CPT

## 2021-03-07 PROCEDURE — A9579 GAD-BASE MR CONTRAST NOS,1ML: HCPCS | Performed by: OBSTETRICS & GYNECOLOGY

## 2021-03-07 PROCEDURE — 6360000004 HC RX CONTRAST MEDICATION: Performed by: OBSTETRICS & GYNECOLOGY

## 2021-03-07 RX ADMIN — GADOTERIDOL 17 ML: 279.3 INJECTION, SOLUTION INTRAVENOUS at 12:46

## 2021-03-09 ENCOUNTER — TELEPHONE (OUTPATIENT)
Dept: GYNECOLOGY | Age: 81
End: 2021-03-09

## 2021-03-18 ENCOUNTER — TELEPHONE (OUTPATIENT)
Dept: ADMINISTRATIVE | Age: 81
End: 2021-03-18

## 2021-03-18 NOTE — TELEPHONE ENCOUNTER
PT seeking F/U appointment after MRI, first available appointment was end of April. PT would like to come in before that date, PT says she was waiting for a callback from someone for scheduling this appointment after 3/7 and she hasn't received a return call as of yet.

## 2021-04-07 ENCOUNTER — OFFICE VISIT (OUTPATIENT)
Dept: GYNECOLOGY | Age: 81
End: 2021-04-07
Payer: MEDICARE

## 2021-04-07 VITALS
BODY MASS INDEX: 28.99 KG/M2 | RESPIRATION RATE: 16 BRPM | SYSTOLIC BLOOD PRESSURE: 129 MMHG | HEIGHT: 65 IN | WEIGHT: 174 LBS | OXYGEN SATURATION: 95 % | HEART RATE: 101 BPM | DIASTOLIC BLOOD PRESSURE: 86 MMHG

## 2021-04-07 DIAGNOSIS — N95.0 POSTMENOPAUSAL BLEEDING: Primary | ICD-10-CM

## 2021-04-07 DIAGNOSIS — D21.9 FIBROIDS: ICD-10-CM

## 2021-04-07 DIAGNOSIS — Z12.4 SCREENING FOR CERVICAL CANCER: ICD-10-CM

## 2021-04-07 DIAGNOSIS — N89.8 VAGINAL DISCHARGE: ICD-10-CM

## 2021-04-07 PROCEDURE — 99213 OFFICE O/P EST LOW 20 MIN: CPT | Performed by: OBSTETRICS & GYNECOLOGY

## 2021-04-07 ASSESSMENT — ENCOUNTER SYMPTOMS
TROUBLE SWALLOWING: 0
WHEEZING: 0
BACK PAIN: 0
ANAL BLEEDING: 0
ABDOMINAL DISTENTION: 0
CHEST TIGHTNESS: 0
NAUSEA: 0
SORE THROAT: 0
CONSTIPATION: 0
DIARRHEA: 0
PHOTOPHOBIA: 0
RECTAL PAIN: 0
BLOOD IN STOOL: 0
APNEA: 0
COUGH: 0
VOMITING: 0
SHORTNESS OF BREATH: 0
ABDOMINAL PAIN: 0
COLOR CHANGE: 0

## 2021-04-07 NOTE — PROGRESS NOTES
Annual GYN Visit    Cheryl Espinoza  Date ofBirth:  1940    Date of Service:  4/7/2021    Chief Complaint:   Nithya Meehan is a 78 y.o. female who presents for postmenopausal bleeding     HPI:  Patient is postmenopausal- She was initially seen in the office on 01/12/2020 and HPI that visit -> she reports history of postmenopausal bleeding x 5 months. Bleeding wasdaily and about one pad/day - light pink to blood tinged, she reports 25 lb weight loss recently. Since her last visit she did not have vaginal bleeding until today- light spotting today      Pelvic ultrasound done 04/21/2020 ->   FINDINGS:       The uterus measures 11.4 x 8.9 centimeters. The endometrium is obscured from view. There is a partially calcified fundal fibroid measuring 5.4 x 4.8 cm.       Neither ovary are visualized.           Impression       1. Limited pelvic ultrasound. Specifically, the endometrium nor ovaries were able to be sonographically visualized. Sonographer notes vaginal bleeding.       2. Presence of uterine fibroid.      Work-up done   Pap smear - unsatisfactory   ENB DONE ->   FINAL DIAGNOSIS:     Endometrial biopsy:   - Marked chronic inflammation with predominantly plasma cells.  See   comment. COMMENT:  The epithelial cells are that of endocervical type.  There is   associated dense plasma cell aggregates.  This may represent chronic   endometritis if the specimen was truly from endometrial origin. Otherwise it may represent marked chronic cervicitis.       CT scan abdomen and pelvis done 02/01/2021  ->   Impression       1. Enlarged uterus with multiple large calcified fibroids. There is gas associated with one of the fibroids and necrosis or infection is not excluded. 2. Fat-containing umbilical hernia. 3. Cardiomegaly. 4. No acute abdominal or pelvic and normality identified. 5. Diverticulosis without diverticulitis.            MRI pelvis done on 03/07/2021   Impression       1.  Very heterogeneous enlarged anteverted multi fibroid uterus with 2 numerous to count calcified and partially calcified myometrial fibroids with the largest lesions described above but scattered throughout the entire uterus with the uterus measuring    12.3 x 9.7 x 11.7 cm       2.  Heterogeneous upper endometrium which appears to communicate to a junctional zone fundal fibroid abutting the endometrium and may communicate partially due to the endometrium best visualized on series 3 image 15, and sagittal image 13 and 14 and may    contain some hemorrhagic components or communication to the endometrial. This lesion measures at least 4.0 x 4.8 x 5.0 cm. Clinical correlation recommended to exclude malignancy       3. Right left ovary are not identified. No dominant follicles remain and no dominant adnexal masses otherwise appreciated with displacement of the organs due to the large myometrial fibroids       4. Moderate sigmoid colon diverticulosis and some rectal wall thickening inferiorly appreciated. No adenopathy or ascites              Health Maintenance   Topic Date Due    COVID-19 Vaccine (1) Never done    DTaP/Tdap/Td vaccine (1 - Tdap) Never done    Shingles Vaccine (1 of 2) Never done    DEXA (modify frequency per FRAX score)  Never done    Pneumococcal 65+ years Vaccine (1 of 1 - PPSV23) Never done   [employer] Visit (AWV)  Never done    Flu vaccine (Season Ended) 09/01/2021    Hepatitis A vaccine  Aged Out    Hepatitis B vaccine  Aged Out    Hib vaccine  Aged Out    Meningococcal (ACWY) vaccine  Aged Out       Past Medical History:   Diagnosis Date    Asymptomatic gallstones 4/21/2020    CVA (cerebral infarction) 2007    neg carotids, nl echocardiogram    Hyperlipidemia     Hypertension     Pulmonary nodule      Past Surgical History:   Procedure Laterality Date    KNEE ARTHROSCOPY       OB History   No obstetric history on file.      Social History     Socioeconomic History    Marital status:  Spouse name: Not on file    Number of children: Not on file    Years of education: Not on file    Highest education level: Not on file   Occupational History    Not on file   Social Needs    Financial resource strain: Not on file    Food insecurity     Worry: Not on file     Inability: Not on file    Transportation needs     Medical: Not on file     Non-medical: Not on file   Tobacco Use    Smoking status: Never Smoker    Smokeless tobacco: Never Used   Substance and Sexual Activity    Alcohol use: No    Drug use: No    Sexual activity: Not Currently     Partners: Female   Lifestyle    Physical activity     Days per week: Not on file     Minutes per session: Not on file    Stress: Not on file   Relationships    Social connections     Talks on phone: Not on file     Gets together: Not on file     Attends Gnosticist service: Not on file     Active member of club or organization: Not on file     Attends meetings of clubs or organizations: Not on file     Relationship status: Not on file    Intimate partner violence     Fear of current or ex partner: Not on file     Emotionally abused: Not on file     Physically abused: Not on file     Forced sexual activity: Not on file   Other Topics Concern    Not on file   Social History Narrative    Not on file     No Known Allergies  Outpatient Medications Marked as Taking for the 4/7/21 encounter (Office Visit) with Coby Wilburn MD   Medication Sig Dispense Refill    aspirin 81 MG chewable tablet Take 81 mg by mouth daily. Family History   Problem Relation Age of Onset    High Blood Pressure Father     Stroke Father     Stroke Sister     Cancer Brother         lung    Cancer Brother         colon         Review of Systems:  Review of Systems   Constitutional: Negative for appetite change, chills, fatigue, fever and unexpected weight change. HENT: Negative for ear pain, hearing loss, mouth sores, sore throat and trouble swallowing. Eyes: Negative for photophobia and visual disturbance. Respiratory: Negative for apnea, cough, chest tightness, shortness of breath and wheezing. Cardiovascular: Negative for chest pain, palpitations and leg swelling. Gastrointestinal: Negative for abdominal distention, abdominal pain, anal bleeding, blood in stool, constipation, diarrhea, nausea, rectal pain and vomiting. Endocrine: Negative for cold intolerance, heat intolerance, polydipsia, polyphagia and polyuria. Genitourinary: Positive for vaginal bleeding. Negative for difficulty urinating, dyspareunia, dysuria, enuresis, frequency, genital sores, hematuria, menstrual problem, pelvic pain, urgency, vaginal discharge and vaginal pain. Musculoskeletal: Negative for arthralgias, back pain, joint swelling and myalgias. Skin: Negative for color change and rash. Neurological: Negative for dizziness, seizures, syncope, light-headedness and headaches. Psychiatric/Behavioral: Negative for agitation, behavioral problems, confusion, decreased concentration, dysphoric mood, hallucinations, self-injury, sleep disturbance and suicidal ideas. The patient is not nervous/anxious and is not hyperactive. Physical Exam:  /86 (Site: Right Upper Arm, Position: Sitting, Cuff Size: Medium Adult)   Pulse 101   Resp 16   Ht 5' 5\" (1.651 m)   Wt 174 lb (78.9 kg)   SpO2 95%   Breastfeeding No   BMI 28.96 kg/m²   BP Readings from Last 3 Encounters:   04/07/21 129/86   02/12/21 134/87   01/22/21 102/62     Body mass index is 28.96 kg/m². Physical Exam  Constitutional:       General: She is not in acute distress. Appearance: She is well-developed. HENT:      Head: Normocephalic and atraumatic. Mouth/Throat:      Pharynx: No oropharyngeal exudate. Eyes:      General: No scleral icterus. Right eye: No discharge. Left eye: No discharge. Conjunctiva/sclera: Conjunctivae normal.   Neck:      Thyroid: No thyromegaly. Cardiovascular:      Rate and Rhythm: Normal rate and regular rhythm. Heart sounds: Normal heart sounds. Pulmonary:      Effort: Pulmonary effort is normal.      Breath sounds: Normal breath sounds. Abdominal:      General: Bowel sounds are normal. There is no distension. Palpations: Abdomen is soft. There is no mass. Tenderness: There is no abdominal tenderness. There is no guarding or rebound. Genitourinary:     Labia:         Right: No rash, tenderness, lesion or injury. Left: No rash, tenderness, lesion or injury. Vagina: No signs of injury and foreign body. Vaginal discharge (copious yellow discharge ) present. No erythema or tenderness. Cervix: Friability present. No cervical motion tenderness or discharge. Uterus: Enlarged (large fibroids uterus ). Not deviated, not fixed and not tender. Adnexa:         Right: No mass, tenderness or fullness. Left: No mass, tenderness or fullness. Rectum: No external hemorrhoid. Skin:     General: Skin is warm. Coloration: Skin is not pale. Findings: No erythema or rash. Neurological:      Mental Status: She is alert. Psychiatric:         Behavior: Behavior normal. Behavior is cooperative. Thought Content: Thought content normal.         Judgment: Judgment normal.           Assessment/Plan:  1. Postmenopausal bleeding  - PAP SMEAR  - [facility] - Sunny Hinojosa DO, Gynecologic Oncology, [place]     2. Vaginal discharge  - C.trachomatis N.gonorrhoeae DNA    3. Screening for cervical cancer  - PAP SMEAR    4. Fibroids      Return if symptoms worsen or fail to improve.

## 2021-04-09 LAB
HPV COMMENT: NORMAL
HPV TYPE 16: NOT DETECTED
HPV TYPE 18: NOT DETECTED
HPVOH (OTHER TYPES): NOT DETECTED

## 2021-04-12 LAB
C TRACH DNA GENITAL QL NAA+PROBE: NORMAL
N. GONORRHOEAE DNA: NORMAL

## 2022-10-08 ENCOUNTER — HOSPITAL ENCOUNTER (INPATIENT)
Age: 82
LOS: 6 days | Discharge: HOME | DRG: 286 | End: 2022-10-14
Attending: EMERGENCY MEDICINE | Admitting: HOSPITALIST
Payer: MEDICARE

## 2022-10-08 ENCOUNTER — APPOINTMENT (OUTPATIENT)
Dept: GENERAL RADIOLOGY | Age: 82
DRG: 286 | End: 2022-10-08
Payer: MEDICARE

## 2022-10-08 DIAGNOSIS — I50.9 CONGESTIVE HEART FAILURE, UNSPECIFIED HF CHRONICITY, UNSPECIFIED HEART FAILURE TYPE (HCC): Primary | ICD-10-CM

## 2022-10-08 LAB
ANION GAP SERPL CALCULATED.3IONS-SCNC: 11 MMOL/L (ref 3–16)
BASOPHILS ABSOLUTE: 0 K/UL (ref 0–0.2)
BASOPHILS RELATIVE PERCENT: 0.5 %
BUN BLDV-MCNC: 11 MG/DL (ref 7–20)
CALCIUM SERPL-MCNC: 9 MG/DL (ref 8.3–10.6)
CHLORIDE BLD-SCNC: 103 MMOL/L (ref 99–110)
CO2: 25 MMOL/L (ref 21–32)
CREAT SERPL-MCNC: 1.2 MG/DL (ref 0.6–1.2)
EOSINOPHILS ABSOLUTE: 0.1 K/UL (ref 0–0.6)
EOSINOPHILS RELATIVE PERCENT: 2.1 %
GFR AFRICAN AMERICAN: 52
GFR NON-AFRICAN AMERICAN: 43
GLUCOSE BLD-MCNC: 132 MG/DL (ref 70–99)
HCT VFR BLD CALC: 33.6 % (ref 36–48)
HEMOGLOBIN: 11.3 G/DL (ref 12–16)
INFLUENZA A: NOT DETECTED
INFLUENZA B: NOT DETECTED
LYMPHOCYTES ABSOLUTE: 1.6 K/UL (ref 1–5.1)
LYMPHOCYTES RELATIVE PERCENT: 29.1 %
MAGNESIUM: 2 MG/DL (ref 1.8–2.4)
MCH RBC QN AUTO: 30.1 PG (ref 26–34)
MCHC RBC AUTO-ENTMCNC: 33.7 G/DL (ref 31–36)
MCV RBC AUTO: 89.5 FL (ref 80–100)
MONOCYTES ABSOLUTE: 0.4 K/UL (ref 0–1.3)
MONOCYTES RELATIVE PERCENT: 8.2 %
NEUTROPHILS ABSOLUTE: 3.2 K/UL (ref 1.7–7.7)
NEUTROPHILS RELATIVE PERCENT: 60.1 %
PDW BLD-RTO: 14 % (ref 12.4–15.4)
PLATELET # BLD: 217 K/UL (ref 135–450)
PMV BLD AUTO: 8.5 FL (ref 5–10.5)
POTASSIUM REFLEX MAGNESIUM: 3.4 MMOL/L (ref 3.5–5.1)
PRO-BNP: 6177 PG/ML (ref 0–449)
RBC # BLD: 3.75 M/UL (ref 4–5.2)
SARS-COV-2 RNA, RT PCR: NOT DETECTED
SODIUM BLD-SCNC: 139 MMOL/L (ref 136–145)
TROPONIN: <0.01 NG/ML
TROPONIN: <0.01 NG/ML
WBC # BLD: 5.3 K/UL (ref 4–11)

## 2022-10-08 PROCEDURE — 6360000002 HC RX W HCPCS: Performed by: PHYSICIAN ASSISTANT

## 2022-10-08 PROCEDURE — 96374 THER/PROPH/DIAG INJ IV PUSH: CPT

## 2022-10-08 PROCEDURE — 36415 COLL VENOUS BLD VENIPUNCTURE: CPT

## 2022-10-08 PROCEDURE — 80048 BASIC METABOLIC PNL TOTAL CA: CPT

## 2022-10-08 PROCEDURE — 6360000002 HC RX W HCPCS: Performed by: HOSPITALIST

## 2022-10-08 PROCEDURE — 87636 SARSCOV2 & INF A&B AMP PRB: CPT

## 2022-10-08 PROCEDURE — 83735 ASSAY OF MAGNESIUM: CPT

## 2022-10-08 PROCEDURE — 71046 X-RAY EXAM CHEST 2 VIEWS: CPT

## 2022-10-08 PROCEDURE — 84484 ASSAY OF TROPONIN QUANT: CPT

## 2022-10-08 PROCEDURE — 2580000003 HC RX 258: Performed by: HOSPITALIST

## 2022-10-08 PROCEDURE — 99285 EMERGENCY DEPT VISIT HI MDM: CPT

## 2022-10-08 PROCEDURE — 83880 ASSAY OF NATRIURETIC PEPTIDE: CPT

## 2022-10-08 PROCEDURE — 93005 ELECTROCARDIOGRAM TRACING: CPT | Performed by: PHYSICIAN ASSISTANT

## 2022-10-08 PROCEDURE — 84443 ASSAY THYROID STIM HORMONE: CPT

## 2022-10-08 PROCEDURE — 1200000000 HC SEMI PRIVATE

## 2022-10-08 PROCEDURE — 85025 COMPLETE CBC W/AUTO DIFF WBC: CPT

## 2022-10-08 RX ORDER — POLYETHYLENE GLYCOL 3350 17 G/17G
17 POWDER, FOR SOLUTION ORAL DAILY PRN
Status: DISCONTINUED | OUTPATIENT
Start: 2022-10-08 | End: 2022-10-14 | Stop reason: HOSPADM

## 2022-10-08 RX ORDER — ACETAMINOPHEN 650 MG/1
650 SUPPOSITORY RECTAL EVERY 6 HOURS PRN
Status: DISCONTINUED | OUTPATIENT
Start: 2022-10-08 | End: 2022-10-14 | Stop reason: HOSPADM

## 2022-10-08 RX ORDER — SODIUM CHLORIDE 0.9 % (FLUSH) 0.9 %
5-40 SYRINGE (ML) INJECTION PRN
Status: DISCONTINUED | OUTPATIENT
Start: 2022-10-08 | End: 2022-10-14 | Stop reason: HOSPADM

## 2022-10-08 RX ORDER — ONDANSETRON 4 MG/1
4 TABLET, ORALLY DISINTEGRATING ORAL EVERY 8 HOURS PRN
Status: DISCONTINUED | OUTPATIENT
Start: 2022-10-08 | End: 2022-10-14 | Stop reason: HOSPADM

## 2022-10-08 RX ORDER — FUROSEMIDE 10 MG/ML
20 INJECTION INTRAMUSCULAR; INTRAVENOUS ONCE
Status: COMPLETED | OUTPATIENT
Start: 2022-10-08 | End: 2022-10-08

## 2022-10-08 RX ORDER — FUROSEMIDE 10 MG/ML
40 INJECTION INTRAMUSCULAR; INTRAVENOUS 2 TIMES DAILY
Status: DISCONTINUED | OUTPATIENT
Start: 2022-10-08 | End: 2022-10-11

## 2022-10-08 RX ORDER — ASPIRIN 81 MG/1
81 TABLET, CHEWABLE ORAL DAILY
Status: DISCONTINUED | OUTPATIENT
Start: 2022-10-09 | End: 2022-10-14 | Stop reason: HOSPADM

## 2022-10-08 RX ORDER — SODIUM CHLORIDE 0.9 % (FLUSH) 0.9 %
5-40 SYRINGE (ML) INJECTION EVERY 12 HOURS SCHEDULED
Status: DISCONTINUED | OUTPATIENT
Start: 2022-10-08 | End: 2022-10-14 | Stop reason: HOSPADM

## 2022-10-08 RX ORDER — ONDANSETRON 2 MG/ML
4 INJECTION INTRAMUSCULAR; INTRAVENOUS EVERY 6 HOURS PRN
Status: DISCONTINUED | OUTPATIENT
Start: 2022-10-08 | End: 2022-10-14 | Stop reason: HOSPADM

## 2022-10-08 RX ORDER — ACETAMINOPHEN 325 MG/1
650 TABLET ORAL EVERY 6 HOURS PRN
Status: DISCONTINUED | OUTPATIENT
Start: 2022-10-08 | End: 2022-10-14 | Stop reason: HOSPADM

## 2022-10-08 RX ORDER — ENOXAPARIN SODIUM 100 MG/ML
40 INJECTION SUBCUTANEOUS DAILY
Status: DISCONTINUED | OUTPATIENT
Start: 2022-10-08 | End: 2022-10-14 | Stop reason: HOSPADM

## 2022-10-08 RX ORDER — SODIUM CHLORIDE 9 MG/ML
INJECTION, SOLUTION INTRAVENOUS PRN
Status: DISCONTINUED | OUTPATIENT
Start: 2022-10-08 | End: 2022-10-14 | Stop reason: HOSPADM

## 2022-10-08 RX ADMIN — SODIUM CHLORIDE, PRESERVATIVE FREE 10 ML: 5 INJECTION INTRAVENOUS at 20:53

## 2022-10-08 RX ADMIN — FUROSEMIDE 20 MG: 10 INJECTION, SOLUTION INTRAMUSCULAR; INTRAVENOUS at 13:00

## 2022-10-08 RX ADMIN — FUROSEMIDE 40 MG: 10 INJECTION, SOLUTION INTRAMUSCULAR; INTRAVENOUS at 18:35

## 2022-10-08 RX ADMIN — ENOXAPARIN SODIUM 40 MG: 100 INJECTION SUBCUTANEOUS at 20:51

## 2022-10-08 ASSESSMENT — ENCOUNTER SYMPTOMS
ABDOMINAL PAIN: 0
NAUSEA: 0
SHORTNESS OF BREATH: 1
CHEST TIGHTNESS: 0
DIARRHEA: 1
VOMITING: 0

## 2022-10-08 ASSESSMENT — PAIN - FUNCTIONAL ASSESSMENT: PAIN_FUNCTIONAL_ASSESSMENT: NONE - DENIES PAIN

## 2022-10-08 NOTE — ED PROVIDER NOTES
ED Attending Attestation Note     Date of evaluation: 10/8/2022    This patient was seen by the advance practice provider. I have seen and examined the patient, agree with the workup, evaluation, management and diagnosis. The care plan has been discussed. I have reviewed the ECG and concur with the NOÉ's interpretation. My assessment reveals patient with SOB and leg edema, sats 90%, found to have new onset HF with bilateral edema and effusions and elevated BNP. Given lasix and will admit for workup. Due to the immediate potential for life-threatening deterioration due to hypoxia from new onset CHF necessitating supplemental O2 and diuretics, I spent 30 minutes providing critical care. This time excludes time spent performing procedures but includes time spent on direct patient care, history retrieval, review of the chart, and discussions with patient, family, and consultant(s).        Mark Singh MD  10/08/22 1257

## 2022-10-08 NOTE — ED PROVIDER NOTES
[facility]          PHYSICIAN ASSISTANT NOTE       Date of evaluation: 10/8/2022    Chief Complaint     Shortness of Breath (Pt states she has been SOB on and off for a month)      History of Present Illness     Keith Cobb is a 78 y.o. female who presents to the emergency department for evaluation of shortness of breath. Symptoms have been intermittent over the last few weeks, and worse over the last 2 or 3 days. Specifically shortness of breath is worse with exertion and minimal activity. No associated chest pain, no reported orthopnea. She has had a cough that is productive of thick sputum, but is not purulent. No fevers or chills or ill contacts. Never had symptoms like this in the past.  No extremity swelling or history of heart failure. Review of Systems     Review of Systems   Constitutional:  Negative for chills, diaphoresis, fatigue and fever. Respiratory:  Positive for shortness of breath. Negative for chest tightness. Cardiovascular:  Negative for chest pain, palpitations and leg swelling. Gastrointestinal:  Positive for diarrhea. Negative for abdominal pain, nausea and vomiting. Past Medical, Surgical, Family, and Social History     She has a past medical history of Asymptomatic gallstones, CVA (cerebral infarction), Hyperlipidemia, Hypertension, and Pulmonary nodule. She has a past surgical history that includes Knee arthroscopy. Her family history includes Cancer in her brother and brother; High Blood Pressure in her father; Stroke in her father and sister. She reports that she has never smoked. She has never used smokeless tobacco. She reports that she does not drink alcohol and does not use drugs. Medications     Previous Medications    ASPIRIN 81 MG CHEWABLE TABLET    Take 81 mg by mouth daily. Allergies     She has No Known Allergies.     Physical Exam     INITIAL VITALS: BP: (!) 143/98, Temp: 97.6 °F (36.4 °C), Heart Rate: 100, Resp: 18, SpO2: 91 %  Physical Exam  Vitals reviewed. Constitutional:       General: She is not in acute distress. Appearance: She is well-developed and normal weight. She is not ill-appearing, toxic-appearing or diaphoretic. HENT:      Head: Normocephalic and atraumatic. Cardiovascular:      Rate and Rhythm: Normal rate and regular rhythm. Heart sounds: No murmur heard. No friction rub. No gallop. Pulmonary:      Effort: Pulmonary effort is normal. No tachypnea, bradypnea or respiratory distress. Breath sounds: Normal breath sounds. No decreased breath sounds, wheezing, rhonchi or rales. Comments: On 2L NC  Abdominal:      Palpations: Abdomen is soft. Musculoskeletal:         General: Normal range of motion. Right lower leg: No edema. Left lower leg: No edema. Skin:     General: Skin is warm. Neurological:      General: No focal deficit present. Mental Status: She is alert. Psychiatric:         Mood and Affect: Mood normal.         Behavior: Behavior normal.       Diagnostic Results     EKG   Interpreted in conjunction with emergency department physician No att. providers found  Rhythm: normal sinus   Rate: normal  Axis: normal  Ectopy: none  Conduction: normal  ST Segments: normal  T Waves: normal  Q Waves:none  Clinical Impression: no acute changes  Comparison:  2017    RADIOLOGY:  XR CHEST (2 VW)   Final Result   Impression:   New bilateral airspace disease and pleural effusions.           LABS:   Results for orders placed or performed during the hospital encounter of 10/08/22   COVID-19 & Influenza Combo    Specimen: Nasopharyngeal Swab   Result Value Ref Range    SARS-CoV-2 RNA, RT PCR NOT DETECTED NOT DETECTED    INFLUENZA A NOT DETECTED NOT DETECTED    INFLUENZA B NOT DETECTED NOT DETECTED   BMP w/ Reflex to MG   Result Value Ref Range    Sodium 139 136 - 145 mmol/L    Potassium reflex Magnesium 3.4 (L) 3.5 - 5.1 mmol/L    Chloride 103 99 - 110 mmol/L    CO2 25 21 - 32 mmol/L    Anion Gap 11 3 - 16    Glucose 132 (H) 70 - 99 mg/dL    BUN 11 7 - 20 mg/dL    Creatinine 1.2 0.6 - 1.2 mg/dL    GFR Non- 43 (A) >60    GFR  52 (A) >60    Calcium 9.0 8.3 - 10.6 mg/dL   CBC with Auto Differential   Result Value Ref Range    WBC 5.3 4.0 - 11.0 K/uL    RBC 3.75 (L) 4.00 - 5.20 M/uL    Hemoglobin 11.3 (L) 12.0 - 16.0 g/dL    Hematocrit 33.6 (L) 36.0 - 48.0 %    MCV 89.5 80.0 - 100.0 fL    MCH 30.1 26.0 - 34.0 pg    MCHC 33.7 31.0 - 36.0 g/dL    RDW 14.0 12.4 - 15.4 %    Platelets 572 870 - 217 K/uL    MPV 8.5 5.0 - 10.5 fL    Neutrophils % 60.1 %    Lymphocytes % 29.1 %    Monocytes % 8.2 %    Eosinophils % 2.1 %    Basophils % 0.5 %    Neutrophils Absolute 3.2 1.7 - 7.7 K/uL    Lymphocytes Absolute 1.6 1.0 - 5.1 K/uL    Monocytes Absolute 0.4 0.0 - 1.3 K/uL    Eosinophils Absolute 0.1 0.0 - 0.6 K/uL    Basophils Absolute 0.0 0.0 - 0.2 K/uL   Troponin   Result Value Ref Range    Troponin <0.01 <0.01 ng/mL   Brain Natriuretic Peptide   Result Value Ref Range    Pro-BNP 6,177 (H) 0 - 449 pg/mL   Magnesium   Result Value Ref Range    Magnesium 2.00 1.80 - 2.40 mg/dL       ED BEDSIDE ULTRASOUND:  No results found. RECENT VITALS:  BP: (!) 148/108, Temp: 97.6 °F (36.4 °C), Heart Rate: 98, Resp: (!) 40, SpO2: 96 %     Procedures   None    ED Course     Nursing Notes, Past Medical Hx,Past Surgical Hx, Social Hx, Allergies, and Family Hx were reviewed.          The patient was given the following medications:  Orders Placed This Encounter   Medications    furosemide (LASIX) injection 20 mg       CONSULTS:  IP CONSULT TO HOSPITALIST  IP CONSULT TO HEART FAILURE NURSE/COORDINATOR  IP CONSULT TO DIETITIAN  IP CONSULT TO [address] [place] [address] / ASSESSMENT / Melodie Da is a 78 y.o. female presenting to the emergency department for evaluation of exertional dyspnea that has been present over the last 2 to 3 weeks, worse over the last 2 to 3 days. Upon presentation she was tachypneic, with oxygen saturations of 90% on room air, improved on 2 L of oxygen via nasal cannula. Vitals otherwise stable and she was afebrile. Breath sounds equal and unlabored with no wheezing or rhonchi. Chest x-ray with pulmonary edema and bilateral pleural effusions. BNP elevated around 6000 with no history of heart failure previous echo present on chart review. No associated chest pain, troponin less than 0.01 with EKG showing no evidence of ischemia or infarction. She was given any of IV Lasix, plan to admit to the medicine service for shortness of breath with dyspnea in the setting of fluid overloaded status with no previous history of heart failure. She was accepted for admission and will be monitored in the ED until she is moved to her new treatment location. This patient was also evaluated by the attending physician. All care plans were discussed and agreed upon. Clinical Impression     1. Congestive heart failure, unspecified HF chronicity, unspecified heart failure type ([facility] [address]        Disposition     PATIENT REFERRED TO:  No follow-up provider specified.     DISCHARGE MEDICATIONS:  New Prescriptions    No medications on file       DISPOSITION Admitted 10/08/2022 01:07:14 PM       Iqra Villa PA-C  10/08/22 [id number]

## 2022-10-08 NOTE — PROGRESS NOTES
Pt arrived on unit with transport. Pt got up and transferred self into bed. Pt is short of breath with simple activities and pt is constantly tachypneic. Pt denies pain. Pt's blood pressure elevated. Pt's gait is steady and pt can walk self to use the restroom. Pt verbalized understanding of call light use. Pt alert and Oriented x4. Pt denies further needs.

## 2022-10-08 NOTE — H&P
Hospital Medicine History & Physical      PCP: Max Durand MD    Date of Admission: 10/8/2022    Date of Service: Pt seen/examined on 10/8/22 and Admitted to Inpatient with expected LOS greater than two midnights due to medical therapy. Chief Complaint: Shortness of breath      History Of Present Illness:     78 y.o. female with remote history of CVA with no residual deficits, hyperlipidemia and hypertension not on medications presented to emergency room with shortness of breath. .     Patient reports progressive shortness of breath for the past 3 to 4 weeks, mainly with exertion. .  Worse over the past 1 to 2 days orthopnea. .  She is a dry cough. No chest pain. No fevers, chills or rigors. No leg swelling. In the emergency room, chest x-ray showed bibasilar infiltrates and effusions. .  proBNP was 6000, /98, pulse 100, respirations 18, temperature 97.6    Past Medical History:          Diagnosis Date    Asymptomatic gallstones 4/21/2020    CVA (cerebral infarction) 2007    neg carotids, nl echocardiogram    Hyperlipidemia     Hypertension     Pulmonary nodule        Past Surgical History:          Procedure Laterality Date    KNEE ARTHROSCOPY         Medications Prior to Admission:      Prior to Admission medications    Medication Sig Start Date End Date Taking? Authorizing Provider   aspirin 81 MG chewable tablet Take 81 mg by mouth daily. Historical Provider, MD       Allergies:  Patient has no known allergies. Social History:      The patient currently lives     TOBACCO:   reports that she has never smoked. She has never used smokeless tobacco.  ETOH:   reports no history of alcohol use. Family History:      Reviewed in detail and negative for DM, CAD, Cancer, CVA.  Positive as follows:        Problem Relation Age of Onset    High Blood Pressure Father     Stroke Father     Stroke Sister     Cancer Brother         lung    Cancer Brother         colon       REVIEW OF SYSTEMS:   Pertinent positives as noted in the HPI. All other systems reviewed and negative. PHYSICAL EXAM:    BP (!) 132/101   Pulse 98   Temp 97.6 °F (36.4 °C)   Resp (!) 40   Ht 5' 6\" (1.676 m)   Wt 172 lb (78 kg)   SpO2 96%   BMI 27.76 kg/m²     General appearance:  No apparent distress, appears stated age and cooperative. HEENT:  Normal cephalic, atraumatic without obvious deformity. Pupils equal, round, and reactive to light. Extra ocular muscles intact. Conjunctivae/corneas clear. Neck: Supple, with full range of motion. No jugular venous distention. Trachea midline. Respiratory:  Normal respiratory effort. Clear to auscultation, bilaterally without Rales/Wheezes/Rhonchi. Cardiovascular:  Regular rate and rhythm with normal S1/S2 without murmurs, rubs or gallops. Abdomen: Soft, non-tender, non-distended with normal bowel sounds. Musculoskeletal:  No clubbing, cyanosis or edema bilaterally. Full range of motion without deformity. Skin: Skin color, texture, turgor normal.  No rashes or lesions. Neurologic:  Neurovascularly intact without any focal sensory/motor deficits. Cranial nerves: II-XII intact, grossly non-focal.  Psychiatric:  Alert and oriented, thought content appropriate, normal insight  Capillary Refill: Brisk,< 3 seconds   Peripheral Pulses: +2 palpable, equal bilaterally       CXR:  I have reviewed the CXR with the following interpretation:   EKG:  I have reviewed the EKG with the following interpretation:     Labs:     Recent Labs     10/08/22  1130   WBC 5.3   HGB 11.3*   HCT 33.6*        Recent Labs     10/08/22  1130      K 3.4*      CO2 25   BUN 11   CREATININE 1.2   CALCIUM 9.0     No results for input(s): AST, ALT, BILIDIR, BILITOT, ALKPHOS in the last 72 hours. No results for input(s): INR in the last 72 hours.   Recent Labs     10/08/22  1130   TROPONINI <0.01       Urinalysis:    No results found for: Omari Pascual, [address], BACTERIA, RBCUA, Bob Pares, GLUCOSEU      ASSESSMENT:    -Acute decompensated heart failure, unspecified type. .  Continue IV Lasix, strict I's and O's and fluid restriction. .  Obtain 2D echo. Consult cardiology    -Essential hypertension -not on medications. .  Sub optimal--started on diuretics and will select further meds as needed based on EF and renal function. .      -Hyperkalemia--replete potassium    -CKD stage III. Elen Cruz Creatinine is 1.2 at baseline. .. Hold diuretics    -History of CVA with no residual deficits--continue aspirin            DVT Prophylaxis: Lovenox  Diet: Cardiac/1800 fluid restriction  Code Status: full code           Janett Kumar MD    Thank you Jessica Middleton MD for the opportunity to be involved in this patient's care. If you have any questions or concerns please feel free to contact me at [phone].

## 2022-10-08 NOTE — LETTER
[address] [place] [facility] [address]  Phone: [phone]             October 14, 2022    Patient: Mick Mg   YOB: 1940   Date of Visit: 10/8/2022       To Whom It May Concern:    Ginger Villasenor was seen and treated in our facility  beginning 10/8/2022 [id number]/14/2022 She may return to work on 10/1522.       Sincerely,       Annia Kumar RN         Signature:__________________________________

## 2022-10-08 NOTE — ED NOTES
Pt going to 6309 report called to [facility] then pt to room via stretcher on O2 per Delgado EMT in stable condition     Chandler Solis, RN  10/08/22 8286

## 2022-10-08 NOTE — PROGRESS NOTES
4 Eyes Admission Assessment     I agree as the admission nurse that 2 RN's have performed a thorough Head to Toe Skin Assessment on the patient. ALL assessment sites listed below have been assessed on admission. Areas assessed by both nurses: Bright Gouty  [x]   Head, Face, and Ears   [x]   Shoulders, Back, and Chest  [x]   Arms, Elbows, and Hands   [x]   Coccyx, Sacrum, and Ischium  [x]   Legs, Feet, and Heels        Does the Patient have Skin Breakdown?   No         Ameya Prevention initiated:  Yes   Wound Care Orders initiated:  No      [facility] nurse consulted for Pressure Injury (Stage 3,4, Unstageable, DTI, NWPT, and Complex wounds) or Ameya score 18 or lower:  No      Nurse 1 eSignature: Electronically signed by Susan Vo RN on 10/8/22 at 4:47 PM EDT    **SHARE this note so that the co-signing nurse is able to place an eSignature**    Nurse 2 eSignature: Electronically signed by Amirah Oconnor RN on 10/8/22 at 7:01 PM EDT

## 2022-10-09 LAB
ANION GAP SERPL CALCULATED.3IONS-SCNC: 9 MMOL/L (ref 3–16)
BUN BLDV-MCNC: 12 MG/DL (ref 7–20)
CALCIUM SERPL-MCNC: 8.8 MG/DL (ref 8.3–10.6)
CHLORIDE BLD-SCNC: 101 MMOL/L (ref 99–110)
CHOLESTEROL, TOTAL: 130 MG/DL (ref 0–199)
CO2: 30 MMOL/L (ref 21–32)
CREAT SERPL-MCNC: 1.2 MG/DL (ref 0.6–1.2)
EKG ATRIAL RATE: 100 BPM
EKG DIAGNOSIS: NORMAL
EKG P AXIS: 46 DEGREES
EKG P-R INTERVAL: 170 MS
EKG Q-T INTERVAL: 354 MS
EKG QRS DURATION: 82 MS
EKG QTC CALCULATION (BAZETT): 456 MS
EKG R AXIS: 36 DEGREES
EKG T AXIS: 97 DEGREES
EKG VENTRICULAR RATE: 100 BPM
GFR AFRICAN AMERICAN: 52
GFR NON-AFRICAN AMERICAN: 43
GLUCOSE BLD-MCNC: 114 MG/DL (ref 70–99)
HDLC SERPL-MCNC: 45 MG/DL (ref 40–60)
LDL CHOLESTEROL CALCULATED: 70 MG/DL
MAGNESIUM: 1.9 MG/DL (ref 1.8–2.4)
POTASSIUM SERPL-SCNC: 3.6 MMOL/L (ref 3.5–5.1)
SODIUM BLD-SCNC: 140 MMOL/L (ref 136–145)
TRIGL SERPL-MCNC: 74 MG/DL (ref 0–150)
TROPONIN: 0.02 NG/ML
TSH SERPL DL<=0.05 MIU/L-ACNC: 2.83 UIU/ML (ref 0.27–4.2)
VLDLC SERPL CALC-MCNC: 15 MG/DL

## 2022-10-09 PROCEDURE — 80048 BASIC METABOLIC PNL TOTAL CA: CPT

## 2022-10-09 PROCEDURE — 99221 1ST HOSP IP/OBS SF/LOW 40: CPT | Performed by: INTERNAL MEDICINE

## 2022-10-09 PROCEDURE — 2580000003 HC RX 258: Performed by: HOSPITALIST

## 2022-10-09 PROCEDURE — 6360000002 HC RX W HCPCS: Performed by: HOSPITALIST

## 2022-10-09 PROCEDURE — 36415 COLL VENOUS BLD VENIPUNCTURE: CPT

## 2022-10-09 PROCEDURE — 83735 ASSAY OF MAGNESIUM: CPT

## 2022-10-09 PROCEDURE — 1200000000 HC SEMI PRIVATE

## 2022-10-09 PROCEDURE — 6370000000 HC RX 637 (ALT 250 FOR IP): Performed by: HOSPITALIST

## 2022-10-09 PROCEDURE — 80061 LIPID PANEL: CPT

## 2022-10-09 RX ORDER — POTASSIUM CHLORIDE 20 MEQ/1
40 TABLET, EXTENDED RELEASE ORAL PRN
Status: DISCONTINUED | OUTPATIENT
Start: 2022-10-09 | End: 2022-10-14 | Stop reason: HOSPADM

## 2022-10-09 RX ORDER — MAGNESIUM SULFATE IN WATER 40 MG/ML
2000 INJECTION, SOLUTION INTRAVENOUS PRN
Status: DISCONTINUED | OUTPATIENT
Start: 2022-10-09 | End: 2022-10-14 | Stop reason: HOSPADM

## 2022-10-09 RX ORDER — POTASSIUM CHLORIDE 20 MEQ/1
40 TABLET, EXTENDED RELEASE ORAL ONCE
Status: COMPLETED | OUTPATIENT
Start: 2022-10-09 | End: 2022-10-09

## 2022-10-09 RX ORDER — POTASSIUM CHLORIDE 7.45 MG/ML
10 INJECTION INTRAVENOUS PRN
Status: DISCONTINUED | OUTPATIENT
Start: 2022-10-09 | End: 2022-10-14 | Stop reason: HOSPADM

## 2022-10-09 RX ADMIN — ASPIRIN 81 MG 81 MG: 81 TABLET ORAL at 08:51

## 2022-10-09 RX ADMIN — FUROSEMIDE 40 MG: 10 INJECTION, SOLUTION INTRAMUSCULAR; INTRAVENOUS at 18:49

## 2022-10-09 RX ADMIN — FUROSEMIDE 40 MG: 10 INJECTION, SOLUTION INTRAMUSCULAR; INTRAVENOUS at 08:51

## 2022-10-09 RX ADMIN — POTASSIUM CHLORIDE 40 MEQ: 1500 TABLET, EXTENDED RELEASE ORAL at 08:51

## 2022-10-09 RX ADMIN — ENOXAPARIN SODIUM 40 MG: 100 INJECTION SUBCUTANEOUS at 20:04

## 2022-10-09 RX ADMIN — SODIUM CHLORIDE, PRESERVATIVE FREE 10 ML: 5 INJECTION INTRAVENOUS at 08:52

## 2022-10-09 RX ADMIN — SODIUM CHLORIDE, PRESERVATIVE FREE 10 ML: 5 INJECTION INTRAVENOUS at 20:07

## 2022-10-09 NOTE — PLAN OF CARE
Patient remains on O2 via nasal canula. Patient noted with SOB with exertion. Plans to discharge home when order obtained.   Problem: Respiratory - Adult  Goal: Achieves optimal ventilation and oxygenation  10/8/2022 [id number] by Soni Martines RN  Outcome: Progressing     Problem: Discharge Planning  Goal: Discharge to home or other facility with appropriate resources  Outcome: Progressing

## 2022-10-09 NOTE — CONSULTS
[facility]  Cardiology Inpatient Consult Service                                                                                          Pt Name: Waldo Palma  Age: 78 y.o. Sex: female  : 1940  Location: 6309/6309-01    Referring Physician: Martin Lofton MD      Reason for Consult:       Reason for Consultation/Chief Complaint: Hypoxic respiratory failure/volume overload/possible heart failure      HPI:      Waldo Palma is a 78 y.o. female with no significant past cardiac history who presented to the hospital with shortness of breath. Patient states that over the past few weeks she has noticed progressive shortness of breath upon exertion and now at rest, currently she is on oxygen which is new for her. She has also noticed some lower extremity edema and mild orthopnea. In the emergency department she was noted to have possibly bilateral pulmonary edema along with an elevated BNP in the 6000's. She is feeling a lot better today after receiving IV Lasix. Were consulted for further evaluation. Histories     Past Medical History:   has a past medical history of Asymptomatic gallstones, CVA (cerebral infarction), Hyperlipidemia, Hypertension, and Pulmonary nodule. Surgical History:   has a past surgical history that includes Knee arthroscopy. Social History:   reports that she has never smoked. She has never used smokeless tobacco. She reports that she does not drink alcohol and does not use drugs. Family History:  No evidence for sudden cardiac death or premature CAD      Medications:       Home Medications  Were reviewed and are listed in nursing record. and/or listed below  Prior to Admission medications    Medication Sig Start Date End Date Taking? Authorizing Provider   aspirin 81 MG chewable tablet Take 81 mg by mouth daily.       Historical Provider, MD          Inpatient Medications:   aspirin  81 mg Oral Daily    sodium chloride flush 5-40 mL IntraVENous 2 times per day    enoxaparin  40 mg SubCUTAneous Daily    furosemide  40 mg IntraVENous BID       IV drips:   sodium chloride         PRN:  potassium chloride **OR** potassium alternative oral replacement **OR** potassium chloride, magnesium sulfate, sodium chloride flush, sodium chloride, ondansetron **OR** ondansetron, polyethylene glycol, acetaminophen **OR** acetaminophen, perflutren lipid microspheres    Allergy:     Patient has no known allergies. Review of Systems:     All 12 point review of symptoms completed. Pertinent positives identified in the HPI, all other review of symptoms negative as below. CONSTITUTIONAL: Nounanticipated weight loss. No change in energy level, sleep pattern, or activity level. SKIN: No rash or pruritis. EYES: No visual changes or diplopia. No scleral icterus. ENT: No Headaches, hearing loss or vertigo. No mouth sores or sore throat. CARDIOVASCULAR: No chest pain/chest pressure/chest discomfort. No palpitations. RESPIRATORY: No cough or wheezing, no sputum production. No hematemesis. GASTROINTESTINAL: No N/V/D. No abdominal pain, appetite loss, blood in stools. GENITOURINARY: No dysuria, trouble voiding, or hematuria. MUSCULOSKELETAL:  No gait disturbance, weakness or joint complaints. NEUROLOGICAL: No headache, diplopia, change in muscle strength, numbness or tingling. No change in gait, balance, coordination, mood, affect, memory, mentation, behavior. PSHYCH: No anxiety, loss of interest, change in sexual behavior, feelings of self-harm, or confusion. ENDOCRINE: No malaise, fatigue or temperature intolerance. No excessive thirst, fluid intake, or urination. No tremor. HEMATOLOGIC: No abnormal bruising or bleeding. ALLERGY: No nasal congestion or hives.       Physical Examination:     Vitals:    10/08/22 2049 10/08/22 2332 10/09/22 0354 10/09/22 0733   BP: (!) 137/93 (!) 124/97 98/68 104/80   Pulse: (!) 102 100 88 85   Resp: 24 22 18 18   Temp: 97.5 °F (36.4 °C) 98 °F (36.7 °C) 98.6 °F (37 °C) 97.8 °F (36.6 °C)   TempSrc: Oral Oral Oral Oral   SpO2: 94% 96% 97% 94%   Weight:   174 lb 2.6 oz (79 kg)    Height:           Wt Readings from Last 3 Encounters:   10/09/22 174 lb 2.6 oz (79 kg)   04/07/21 174 lb (78.9 kg)   02/12/21 177 lb (80.3 kg)       Objective:  Vital signs: (most recent): Blood pressure 104/80, pulse 85, temperature 97.8 °F (36.6 °C), temperature source Oral, resp. rate 18, height 5' 6\" (1.676 m), weight 174 lb 2.6 oz (79 kg), SpO2 94 %, not currently breastfeeding.         General Appearance:  Alert, cooperative, no distress, appears stated age Appropriate weight   Head:  Normocephalic, without obvious abnormality, atraumatic   Eyes:  PERRL, conjunctiva/corneas clear EOM intact  Ears normal   Throat no lesions       Nose: Nares normal, no drainage or sinus tenderness   Throat: Lips, mucosa, and tongue normal   Neck: Supple, symmetrical, trachea midline, no adenopathy, thyroid: not enlarged, symmetric, no tenderness/mass/nodules, no carotid bruit or JVD       Lungs:   Clear to auscultation bilaterally, respirations unlabored   Chest Wall:  No tenderness or deformity   Heart:  Regular rate and rhythm, S1, S2 normal, no murmur, rub or gallop PMI intact   Abdomen:   Soft, non-tender, bowel sounds active all four quadrants,  no masses, no organomegaly       Extremities: Extremities normal, atraumatic, no cyanosis or edema   Pulses: 2+ and symmetric   Skin: Skin color, texture, turgor normal, no rashes or lesions   Pysch: Normal mood and affect   Neurologic: Normal gross motor and sensory exam.  Cranial nerves intact        Labs:     Recent Labs     10/08/22  1130      K 3.4*   BUN 11   CREATININE 1.2      CO2 25   GLUCOSE 132*   CALCIUM 9.0   MG 2.00     Recent Labs     10/08/22  1130   WBC 5.3   HGB 11.3*   HCT 33.6*      MCV 89.5     No results for input(s): CHOLTOT, TRIG, HDL, CHOLHDL, LDL in the last 72 transcription errors may be present. I have personally reviewed the reports and images of labs, radiological studies, cardiac studies including ECG's and telemetry, current and old medical records. Tay Salinas MD, [facility], [facility] [address]

## 2022-10-09 NOTE — PROGRESS NOTES
Hospitalist Progress Note      PCP: Maria R Hirsch MD    Date of Admission: 10/8/2022        Subjective:     Feels better. Shortness of breath is improved. Medications:  Reviewed    Infusion Medications    sodium chloride       Scheduled Medications    potassium chloride  40 mEq Oral Once    aspirin  81 mg Oral Daily    sodium chloride flush  5-40 mL IntraVENous 2 times per day    enoxaparin  40 mg SubCUTAneous Daily    furosemide  40 mg IntraVENous BID     PRN Meds: potassium chloride **OR** potassium alternative oral replacement **OR** potassium chloride, magnesium sulfate, sodium chloride flush, sodium chloride, ondansetron **OR** ondansetron, polyethylene glycol, acetaminophen **OR** acetaminophen, perflutren lipid microspheres      Intake/Output Summary (Last 24 hours) at 10/9/2022 0838  Last data filed at 10/8/2022 2332  Gross per 24 hour   Intake --   Output 900 ml   Net -900 ml       Physical Exam Performed:    /80   Pulse 85   Temp 97.8 °F (36.6 °C) (Oral)   Resp 18   Ht 5' 6\" (1.676 m)   Wt 174 lb 2.6 oz (79 kg)   SpO2 94%   BMI 28.11 kg/m²     General appearance: No apparent distress, appears stated age and cooperative. HEENT: Pupils equal, round, and reactive to light. Conjunctivae/corneas clear. Neck: Supple, with full range of motion. No jugular venous distention. Trachea midline. Respiratory:  Normal respiratory effort. Clear to auscultation, bilaterally without Rales/Wheezes/Rhonchi. Cardiovascular: Regular rate and rhythm with normal S1/S2 without murmurs, rubs or gallops. Abdomen: Soft, non-tender, non-distended with normal bowel sounds. Musculoskeletal: No clubbing, cyanosis or edema bilaterally. Full range of motion without deformity. Skin: Skin color, texture, turgor normal.  No rashes or lesions. Neurologic:  Neurovascularly intact without any focal sensory/motor deficits.  Cranial nerves: II-XII intact, grossly non-focal.  Psychiatric: Alert and oriented, thought content appropriate, normal insight  Capillary Refill: Brisk, 3 seconds, normal   Peripheral Pulses: +2 palpable, equal bilaterally       Labs:   Recent Labs     10/08/22  1130   WBC 5.3   HGB 11.3*   HCT 33.6*        Recent Labs     10/08/22  1130      K 3.4*      CO2 25   BUN 11   CREATININE 1.2   CALCIUM 9.0     No results for input(s): AST, ALT, BILIDIR, BILITOT, ALKPHOS in the last 72 hours. No results for input(s): INR in the last 72 hours. Recent Labs     10/08/22  1130 10/08/22  2006 10/08/22  2354   TROPONINI <0.01 <0.01 0.02*       Urinalysis:    No results found for: Riley Rehan, BACTERIA, RBCUA, BLOODU, SPECGRAV, GLUCOSEU    Radiology:  XR CHEST (2 VW)   Final Result   Impression:   New bilateral airspace disease and pleural effusions. Assessment/Plan:      -Acute decompensated heart failure, unspecified type. .  Continue IV Lasix, strict I's and O's and fluid restriction. .  2D echo pending. Portia Oconnor GDMT based on EF.  cardiology consult appreciated     -Essential hypertension -not on medications. .  BP now stable-continue treatment as above. .        -Hyokalemia--repleted     -CKD stage III. Portia Oconnor Creatinine is 1.2 at baseline. ..       -History of CVA with no residual deficits--continue aspirin      DVT Prophylaxis: Lovenox  Diet: ADULT DIET; Regular;  Low Sodium (2 gm); 1800 ml  Code Status: Full Code  PT/OT Eval Status:           Christen Christianson MD

## 2022-10-10 LAB
ANION GAP SERPL CALCULATED.3IONS-SCNC: 9 MMOL/L (ref 3–16)
BUN BLDV-MCNC: 15 MG/DL (ref 7–20)
CALCIUM SERPL-MCNC: 8.8 MG/DL (ref 8.3–10.6)
CHLORIDE BLD-SCNC: 100 MMOL/L (ref 99–110)
CO2: 29 MMOL/L (ref 21–32)
CREAT SERPL-MCNC: 1.3 MG/DL (ref 0.6–1.2)
GFR AFRICAN AMERICAN: 47
GFR NON-AFRICAN AMERICAN: 39
GLUCOSE BLD-MCNC: 92 MG/DL (ref 70–99)
MAGNESIUM: 1.9 MG/DL (ref 1.8–2.4)
POTASSIUM SERPL-SCNC: 3.5 MMOL/L (ref 3.5–5.1)
SODIUM BLD-SCNC: 138 MMOL/L (ref 136–145)

## 2022-10-10 PROCEDURE — 83735 ASSAY OF MAGNESIUM: CPT

## 2022-10-10 PROCEDURE — 1200000000 HC SEMI PRIVATE

## 2022-10-10 PROCEDURE — 80048 BASIC METABOLIC PNL TOTAL CA: CPT

## 2022-10-10 PROCEDURE — 6370000000 HC RX 637 (ALT 250 FOR IP): Performed by: HOSPITALIST

## 2022-10-10 PROCEDURE — 36415 COLL VENOUS BLD VENIPUNCTURE: CPT

## 2022-10-10 PROCEDURE — 2580000003 HC RX 258: Performed by: HOSPITALIST

## 2022-10-10 PROCEDURE — 6360000002 HC RX W HCPCS: Performed by: HOSPITALIST

## 2022-10-10 RX ADMIN — SODIUM CHLORIDE, PRESERVATIVE FREE 10 ML: 5 INJECTION INTRAVENOUS at 22:00

## 2022-10-10 RX ADMIN — ASPIRIN 81 MG 81 MG: 81 TABLET ORAL at 09:45

## 2022-10-10 RX ADMIN — POLYETHYLENE GLYCOL 3350 17 G: 17 POWDER, FOR SOLUTION ORAL at 18:31

## 2022-10-10 RX ADMIN — FUROSEMIDE 40 MG: 10 INJECTION, SOLUTION INTRAMUSCULAR; INTRAVENOUS at 11:34

## 2022-10-10 RX ADMIN — ACETAMINOPHEN 650 MG: 325 TABLET, FILM COATED ORAL at 18:25

## 2022-10-10 RX ADMIN — ENOXAPARIN SODIUM 40 MG: 100 INJECTION SUBCUTANEOUS at 22:13

## 2022-10-10 RX ADMIN — SODIUM CHLORIDE, PRESERVATIVE FREE 10 ML: 5 INJECTION INTRAVENOUS at 09:45

## 2022-10-10 ASSESSMENT — PAIN SCALES - GENERAL
PAINLEVEL_OUTOF10: 0
PAINLEVEL_OUTOF10: 2

## 2022-10-10 ASSESSMENT — PAIN DESCRIPTION - DESCRIPTORS: DESCRIPTORS: DISCOMFORT

## 2022-10-10 ASSESSMENT — PAIN DESCRIPTION - ORIENTATION: ORIENTATION: OTHER (COMMENT)

## 2022-10-10 ASSESSMENT — PAIN - FUNCTIONAL ASSESSMENT: PAIN_FUNCTIONAL_ASSESSMENT: ACTIVITIES ARE NOT PREVENTED

## 2022-10-10 ASSESSMENT — PAIN DESCRIPTION - LOCATION: LOCATION: OTHER (COMMENT)

## 2022-10-10 NOTE — PROGRESS NOTES
[facility]  Cardiology Inpatient Consult Service  Daily Progress Note        Admit Date:  10/8/2022    Referring Physician: Boubacar Viera MD    Reason for Consultation/Chief Complaint: Hypoxic Respiratory Failure/Volume Overload/Possible Heart Failure    Subjective: Interval history:  Patient seen this AM at bedside. She reports having an improvement in her SOB. She reports having SOB upon ambulating to the washroom but this has significantly improved. I/O undocumented however patient reports having increased urination. Mayda Pulido is a 78 y.o. female w/no significant past cardiac history who presented w/cc SOB. Her SOB was progressive, previously occured upon exertion and now upon rest. Patient additionally had a new oxygen requirement in addition to lower extremity edema and mild orthopnea. Patient was found to have BNP in 6000s and with possible bilateral pulmonary edema. She has since received Lasix IV.    Medications:   aspirin  81 mg Oral Daily    sodium chloride flush  5-40 mL IntraVENous 2 times per day    enoxaparin  40 mg SubCUTAneous Daily    furosemide  40 mg IntraVENous BID       IV drips:   sodium chloride         PRN:  potassium chloride **OR** potassium alternative oral replacement **OR** potassium chloride, magnesium sulfate, sodium chloride flush, sodium chloride, ondansetron **OR** ondansetron, polyethylene glycol, acetaminophen **OR** acetaminophen, perflutren lipid microspheres      Objective:     Vitals:    10/09/22 2001 10/09/22 2350 10/10/22 0341 10/10/22 0438   BP: 106/78 (!) 129/90 99/64    Pulse: 94 85 85    Resp: 18 18 16    Temp: 97.5 °F (36.4 °C) 97.6 °F (36.4 °C) 97.8 °F (36.6 °C)    TempSrc: Oral Oral Oral    SpO2: 99% 97% 98%    Weight:    173 lb 15.1 oz (78.9 kg)   Height:           Intake/Output Summary (Last 24 hours) at 10/10/2022 0821  Last data filed at 10/9/2022 1800  Gross per 24 hour   Intake 480 ml   Output --   Net 480 ml I/O last 3 completed shifts: In: 480 [P.O.:480]  Out: 900 [Urine:900]  Wt Readings from Last 3 Encounters:   10/10/22 173 lb 15.1 oz (78.9 kg)   04/07/21 174 lb (78.9 kg)   02/12/21 177 lb (80.3 kg)       Admit Wt: Weight: 172 lb (78 kg)   Todays Wt: Weight: 173 lb 15.1 oz (78.9 kg)    TELEMETRY: Personally interpreted Sinus     Physical Exam:     General:  Awake, alert, NAD  Skin:  Warm and dry  Chest:  Clear to auscultation, respiration normal  Cardiovascular:  RRR S1S2  Abdomen:  Soft, non-tender  Extremities:  no edema      Objective:  Vital signs: (most recent): Blood pressure 96/69, pulse 83, temperature 98 °F (36.7 °C), temperature source Oral, resp. rate 16, height 5' 6\" (1.676 m), weight 173 lb 15.1 oz (78.9 kg), SpO2 95 %, not currently breastfeeding. Labs:   Recent Labs     10/08/22  1130 10/09/22  1139    140   K 3.4* 3.6   BUN 11 12   CREATININE 1.2 1.2    101   CO2 25 30   GLUCOSE 132* 114*   CALCIUM 9.0 8.8   MG 2.00 1.90     Recent Labs     10/08/22  1130   WBC 5.3   HGB 11.3*   HCT 33.6*      MCV 89.5     Recent Labs     10/09/22  1139   TRIG 74   HDL 45     No results for input(s): PTT, INR in the last 72 hours. Invalid input(s): PT  Recent Labs     10/08/22  1130 10/08/22  2006 10/08/22  2354   TROPONINI <0.01 <0.01 0.02*     No results for input(s): BNP in the last 72 hours. No results for input(s): NTPROBNP in the last 72 hours. Recent Labs     10/08/22  2006   TSH 2.83       Imaging:   I personally reviewed imaging studies including CXR, Stress test, TTE/NANI. Assessment & Plan:     #Hypoxic Respiratory Failure/Volume Overload/Possible HF  -continue IV Lasix BID   -strict I/O, daily weights  -Echo pending  -med adjustments post echo  -likely starting on low-dose Nanine Mikey, MD, PGY-1  Thank you for allowing to us to participate in the care of Mhasa Martinez. Please call our service with questions.     All questions and concerns were addressed to the patient/family. Alternatives to my treatment were discussed. The note was completed using EMR. Every effort was made to ensure accuracy; however, inadvertent computerized transcription errors may be present. I have personally reviewed the reports and images of labs, radiological studies, cardiac studies including ECG's and telemetry, current and old medical records. Tay Lama MD, [facility] [address]    10/10/2022 8:21 AM

## 2022-10-10 NOTE — PLAN OF CARE
Problem: Respiratory - Adult  Goal: Achieves optimal ventilation and oxygenation  Outcome: Progressing  Flowsheets (Taken 10/10/2022 5118)  Achieves optimal ventilation and oxygenation:   Assess for changes in respiratory status   Assess for changes in mentation and behavior   Position to facilitate oxygenation and minimize respiratory effort     Problem: Cardiovascular - Adult  Goal: Maintains optimal cardiac output and hemodynamic stability  Outcome: Progressing     Problem: Cardiovascular - Adult  Goal: Absence of cardiac dysrhythmias or at baseline  Outcome: Progressing     Problem: Metabolic/Fluid and Electrolytes - Adult  Goal: Electrolytes maintained within normal limits  Outcome: Progressing    Problem: Discharge Planning  Goal: Discharge to home or other facility with appropriate resources  Outcome: Progressing     Problem: Safety - Adult  Goal: Free from fall injury  Outcome: Progressing     Problem: Chronic Conditions and Co-morbidities  Goal: Patient's chronic conditions and co-morbidity symptoms are monitored and maintained or improved  Outcome: Progressing        Problem: Metabolic/Fluid and Electrolytes - Adult  Goal: Hemodynamic stability and optimal renal function maintained  Outcome: Progressing

## 2022-10-10 NOTE — PLAN OF CARE
Problem: Respiratory - Adult  Goal: Achieves optimal ventilation and oxygenation  Outcome: Progressing     Problem: Safety - Adult  Goal: Free from fall injury  Outcome: Progressing     Patient resting in bed, medications per order, denies pain/needs, and her Ebb Mitchell is at her bedside. Will continue to monitor.

## 2022-10-10 NOTE — PROGRESS NOTES
BP 96/69, message sent to Dr. Silva Ley regarding giving or holding IVP lasix. Per Dr. Silva Ley, hold for now and recheck BP in one hour.

## 2022-10-10 NOTE — PROGRESS NOTES
Hospitalist Progress Note      PCP: Yudy Abraham MD    Date of Admission: 10/8/2022        Subjective:     Shortness of breath is improving. No chest pain    Medications:  Reviewed    Infusion Medications    sodium chloride       Scheduled Medications    aspirin  81 mg Oral Daily    sodium chloride flush  5-40 mL IntraVENous 2 times per day    enoxaparin  40 mg SubCUTAneous Daily    furosemide  40 mg IntraVENous BID     PRN Meds: potassium chloride **OR** potassium alternative oral replacement **OR** potassium chloride, magnesium sulfate, sodium chloride flush, sodium chloride, ondansetron **OR** ondansetron, polyethylene glycol, acetaminophen **OR** acetaminophen, perflutren lipid microspheres      Intake/Output Summary (Last 24 hours) at 10/10/2022 1203  Last data filed at 10/10/2022 0942  Gross per 24 hour   Intake 600 ml   Output --   Net 600 ml         Physical Exam Performed:    /80   Pulse 88   Temp 97.6 °F (36.4 °C)   Resp 18   Ht 5' 6\" (1.676 m)   Wt 173 lb 15.1 oz (78.9 kg)   SpO2 97%   BMI 28.08 kg/m²     General appearance: No apparent distress, appears stated age and cooperative. HEENT: Pupils equal, round, and reactive to light. Conjunctivae/corneas clear. Neck: Supple, with full range of motion. No jugular venous distention. Trachea midline. Respiratory:  Normal respiratory effort. Clear to auscultation, bilaterally without Rales/Wheezes/Rhonchi. Cardiovascular: Regular rate and rhythm with normal S1/S2 without murmurs, rubs or gallops. Abdomen: Soft, non-tender, non-distended with normal bowel sounds. Musculoskeletal: No clubbing, cyanosis or edema bilaterally. Full range of motion without deformity. Skin: Skin color, texture, turgor normal.  No rashes or lesions. Neurologic:  Neurovascularly intact without any focal sensory/motor deficits.  Cranial nerves: II-XII intact, grossly non-focal.  Psychiatric: Alert and oriented, thought content appropriate, normal insight  Capillary Refill: Brisk, 3 seconds, normal   Peripheral Pulses: +2 palpable, equal bilaterally       Labs:   Recent Labs     10/08/22  1130   WBC 5.3   HGB 11.3*   HCT 33.6*          Recent Labs     10/08/22  1130 10/09/22  1139    140   K 3.4* 3.6    101   CO2 25 30   BUN 11 12   CREATININE 1.2 1.2   CALCIUM 9.0 8.8       No results for input(s): AST, ALT, BILIDIR, BILITOT, ALKPHOS in the last 72 hours. No results for input(s): INR in the last 72 hours. Recent Labs     10/08/22  1130 10/08/22  2006 10/08/22  2354   TROPONINI <0.01 <0.01 0.02*         Urinalysis:    No results found for: Ivon Pinch, BACTERIA, RBCUA, BLOODU, SPECGRAV, GLUCOSEU    Radiology:  XR CHEST (2 VW)   Final Result   Impression:   New bilateral airspace disease and pleural effusions. Assessment/Plan:      -Acute decompensated heart failure, unspecified type. .  Continue IV Lasix, strict I's and O's and fluid restriction. .  2D echo pending. Guerrero Yan GDMT based on EF.  cardiology consult appreciated     -Essential hypertension by history but not on meds  BP is on the lower side this a.m. Guerrero Yan Continue to monitor        -Hyokalemia--repleted     -CKD stage III. Guerrero Yan Creatinine is 1.2 at baseline. .. Continue to monitor     -History of CVA with no residual deficits--continue aspirin      DVT Prophylaxis: Lovenox  Diet: ADULT DIET; Regular; Low Sodium (2 gm); 1800 ml  Code Status: Full Code  PT/OT Eval Status:     Disposition. .  Home in 1 to 2 days pending echo findings and need for further work-up      Martin Lofton MD

## 2022-10-10 NOTE — DISCHARGE INSTRUCTIONS
Extra Heart Failure sites:   https://EPS.[employer]/ --- this is American Heart Association interactive Healthier Living with Heart Failure guidebook. Please copy and paste link into search bar. Use your mouse to scroll through the pages. Lots and lots of info / tips    HF [place] sabrina  --- free smart phone sabrina available for [employer] and [employer]. Use your phone to track sodium / fluid intake,  symptoms, weight, etc.    [employer] - website-- Jeffie Lightning is a dialysis company. All dialysis patients follow a renal diet which IS low sodium!! This website offers free seasonal cookbooks. Each quarter, they will release 25-30 new recipes with a breakdown of calories, sodium, glucose, etc    [email or web address]/recipes -- more free recipes      Radial/Brachial Access Angiogram  Patient Discharge Instructions    -Follow up with Benedict Sutton NP 1 week after discharge    -Follow up with Dr. Zohaib Rivers in 4 weeks after discharge    -Call the office at [phone] for appointment      The [address] [place] [address]. [address] [facility], [address] [place] [address]    Phone: [phone]  Fax:       Day 1 (Procedure Day):  Rest for the remainder of the day. Avoid excessive use of the affected arm. Do not drive a car. Do not be alone. Leave dressing intact. Day 2:  You may drive a car, unless otherwise directed by physician. Remove dressing. You may bathe/shower, but wash gently around the puncture site and pat dry. Place new band-aid on site daily until skin heals. Things to Avoid:  You may not do any strenuous exercises for one week. (ex: golfing, bowling, tennis, vacuum, snow removal, jogging, and aerobic exercises). No hot tubs, baths, or pools for 3-5 days.   Do not lift anything over 3-5 pounds for 3 days, with affected arm.  No lotions, powders, or ointments near site for 5 days. Things to watch for:  You may have a small lump or a bruise. This is common and will go away. If bleeding occurs from the site, or a hematoma (lump) begins to increase in size, immediately apply pressure directly over the site and call 911 to return to the hospital.  Report any coolness or numbness in the arm or hand immediately. Report any excessive pain of the arm or hand immediately. If mild discomfort occurs at the puncture site you may place an ice pack on the site at 15 minute intervals. .   Watch for signs and symptoms of an infection at the arm site (fever, local pain, redness, warmth or discharge from the puncture site), call your physician immediately if any develop. Other:  No work for 72 hours if you received a stent; otherwise you may go back to work the following day (as long as your job does not interfere with the lifting restrictions).

## 2022-10-11 PROBLEM — I50.21 ACUTE SYSTOLIC HEART FAILURE (HCC): Status: ACTIVE | Noted: 2022-10-11

## 2022-10-11 PROBLEM — I47.10 SVT (SUPRAVENTRICULAR TACHYCARDIA): Status: ACTIVE | Noted: 2022-10-11

## 2022-10-11 PROBLEM — I47.29 NSVT (NONSUSTAINED VENTRICULAR TACHYCARDIA) (HCC): Status: ACTIVE | Noted: 2022-10-11

## 2022-10-11 PROBLEM — I47.1 SVT (SUPRAVENTRICULAR TACHYCARDIA) (HCC): Status: ACTIVE | Noted: 2022-10-11

## 2022-10-11 LAB
ANION GAP SERPL CALCULATED.3IONS-SCNC: 7 MMOL/L (ref 3–16)
BUN BLDV-MCNC: 18 MG/DL (ref 7–20)
CALCIUM SERPL-MCNC: 9.1 MG/DL (ref 8.3–10.6)
CHLORIDE BLD-SCNC: 99 MMOL/L (ref 99–110)
CO2: 30 MMOL/L (ref 21–32)
CREAT SERPL-MCNC: 1.2 MG/DL (ref 0.6–1.2)
GFR AFRICAN AMERICAN: 52
GFR NON-AFRICAN AMERICAN: 43
GLUCOSE BLD-MCNC: 94 MG/DL (ref 70–99)
LV EF: 20 %
LVEF MODALITY: NORMAL
MAGNESIUM: 2.3 MG/DL (ref 1.8–2.4)
POTASSIUM SERPL-SCNC: 4.1 MMOL/L (ref 3.5–5.1)
PRO-BNP: 1936 PG/ML (ref 0–449)
SODIUM BLD-SCNC: 136 MMOL/L (ref 136–145)

## 2022-10-11 PROCEDURE — 6370000000 HC RX 637 (ALT 250 FOR IP): Performed by: INTERNAL MEDICINE

## 2022-10-11 PROCEDURE — 6370000000 HC RX 637 (ALT 250 FOR IP): Performed by: NURSE PRACTITIONER

## 2022-10-11 PROCEDURE — 6370000000 HC RX 637 (ALT 250 FOR IP): Performed by: HOSPITALIST

## 2022-10-11 PROCEDURE — 1200000000 HC SEMI PRIVATE

## 2022-10-11 PROCEDURE — 83735 ASSAY OF MAGNESIUM: CPT

## 2022-10-11 PROCEDURE — 36415 COLL VENOUS BLD VENIPUNCTURE: CPT

## 2022-10-11 PROCEDURE — 93356 MYOCRD STRAIN IMG SPCKL TRCK: CPT

## 2022-10-11 PROCEDURE — 99233 SBSQ HOSP IP/OBS HIGH 50: CPT | Performed by: NURSE PRACTITIONER

## 2022-10-11 PROCEDURE — 6360000002 HC RX W HCPCS: Performed by: HOSPITALIST

## 2022-10-11 PROCEDURE — 6360000002 HC RX W HCPCS: Performed by: INTERNAL MEDICINE

## 2022-10-11 PROCEDURE — 2580000003 HC RX 258: Performed by: HOSPITALIST

## 2022-10-11 PROCEDURE — 83880 ASSAY OF NATRIURETIC PEPTIDE: CPT

## 2022-10-11 PROCEDURE — 80048 BASIC METABOLIC PNL TOTAL CA: CPT

## 2022-10-11 PROCEDURE — 93306 TTE W/DOPPLER COMPLETE: CPT

## 2022-10-11 RX ORDER — FUROSEMIDE 20 MG/1
20 TABLET ORAL 2 TIMES DAILY
Status: DISCONTINUED | OUTPATIENT
Start: 2022-10-11 | End: 2022-10-13

## 2022-10-11 RX ORDER — MAGNESIUM SULFATE 1 G/100ML
1000 INJECTION INTRAVENOUS ONCE
Status: COMPLETED | OUTPATIENT
Start: 2022-10-11 | End: 2022-10-11

## 2022-10-11 RX ORDER — POTASSIUM CHLORIDE 20 MEQ/1
20 TABLET, EXTENDED RELEASE ORAL ONCE
Status: COMPLETED | OUTPATIENT
Start: 2022-10-11 | End: 2022-10-11

## 2022-10-11 RX ADMIN — METOPROLOL TARTRATE 25 MG: 25 TABLET, FILM COATED ORAL at 12:35

## 2022-10-11 RX ADMIN — METOPROLOL TARTRATE 25 MG: 25 TABLET, FILM COATED ORAL at 21:16

## 2022-10-11 RX ADMIN — ASPIRIN 81 MG 81 MG: 81 TABLET ORAL at 08:59

## 2022-10-11 RX ADMIN — SODIUM CHLORIDE, PRESERVATIVE FREE 10 ML: 5 INJECTION INTRAVENOUS at 09:01

## 2022-10-11 RX ADMIN — ENOXAPARIN SODIUM 40 MG: 100 INJECTION SUBCUTANEOUS at 21:14

## 2022-10-11 RX ADMIN — MAGNESIUM SULFATE HEPTAHYDRATE 1000 MG: 1 INJECTION, SOLUTION INTRAVENOUS at 02:56

## 2022-10-11 RX ADMIN — POTASSIUM CHLORIDE 20 MEQ: 1500 TABLET, EXTENDED RELEASE ORAL at 02:49

## 2022-10-11 NOTE — CARE COORDINATION
CM  attempted  to meet with pt  but  both  pt and family member  at  bedside  sleeping soundly :    CM  will follow up later  time permitting . Per  Chart  pt admitted from Home : CHF  ;  Cardiology following :  IV Lasix  Echo pending  . Electronically signed by Lurdes Townsend RN on 10/11/2022 at 6:01 PM         Lurdes Townsend RN Case Manager  [facility]  [address] [place] [address].   [facility] [id number] [phone]  Fax [phone]

## 2022-10-11 NOTE — PROGRESS NOTES
Hospitalist Progress Note      PCP: Tony Mclean MD    Date of Admission: 10/8/2022        Subjective:     Shortness of breath is improving. No chest pain    Medications:  Reviewed    Infusion Medications    sodium chloride       Scheduled Medications    furosemide  20 mg Oral BID    aspirin  81 mg Oral Daily    sodium chloride flush  5-40 mL IntraVENous 2 times per day    enoxaparin  40 mg SubCUTAneous Daily     PRN Meds: potassium chloride **OR** potassium alternative oral replacement **OR** potassium chloride, magnesium sulfate, sodium chloride flush, sodium chloride, ondansetron **OR** ondansetron, polyethylene glycol, acetaminophen **OR** acetaminophen, perflutren lipid microspheres      Intake/Output Summary (Last 24 hours) at 10/11/2022 1047  Last data filed at 10/10/2022 2041  Gross per 24 hour   Intake 600 ml   Output 1400 ml   Net -800 ml       Physical Exam Performed:    BP 95/63   Pulse 82   Temp 98.1 °F (36.7 °C) (Oral)   Resp 16   Ht 5' 6\" (1.676 m)   Wt 161 lb 4.8 oz (73.2 kg)   SpO2 92%   BMI 26.03 kg/m²     General appearance: No apparent distress, appears stated age and cooperative. HEENT: Pupils equal, round, and reactive to light. Conjunctivae/corneas clear. Neck: Supple, with full range of motion. No jugular venous distention. Trachea midline. Respiratory:  Normal respiratory effort. Clear to auscultation, bilaterally without Rales/Wheezes/Rhonchi. Cardiovascular: Regular rate and rhythm with normal S1/S2 without murmurs, rubs or gallops. Abdomen: Soft, non-tender, non-distended with normal bowel sounds. Musculoskeletal: No clubbing, cyanosis or edema bilaterally. Full range of motion without deformity. Skin: Skin color, texture, turgor normal.  No rashes or lesions. Neurologic:  Neurovascularly intact without any focal sensory/motor deficits.  Cranial nerves: II-XII intact, grossly non-focal.  Psychiatric: Alert and oriented, thought content appropriate, normal insight  Capillary Refill: Brisk, 3 seconds, normal   Peripheral Pulses: +2 palpable, equal bilaterally       Labs:   Recent Labs     10/08/22  1130   WBC 5.3   HGB 11.3*   HCT 33.6*        Recent Labs     10/09/22  1139 10/10/22  1030 10/11/22  0745    138 136   K 3.6 3.5 4.1    100 99   CO2 30 29 30   BUN 12 15 18   CREATININE 1.2 1.3* 1.2   CALCIUM 8.8 8.8 9.1     No results for input(s): AST, ALT, BILIDIR, BILITOT, ALKPHOS in the last 72 hours. No results for input(s): INR in the last 72 hours. Recent Labs     10/08/22  1130 10/08/22  2006 10/08/22  2354   TROPONINI <0.01 <0.01 0.02*       Urinalysis:    No results found for: Oscar Lansing, BACTERIA, RBCUA, BLOODU, SPECGRAV, GLUCOSEU    Radiology:  XR CHEST (2 VW)   Final Result   Impression:   New bilateral airspace disease and pleural effusions. Assessment/Plan:    Acute decompensated heart failure, unspecified type   - Transition from IV Lasix to PO Lasix   - Strict I&Os    - Echo pending   - Cardiology consulted, appreciate recommendations   - Start GDMT based on echos     Essential hypertension   - Historical diagnosis, not on medication at home, BP on lower end IP, will monitor        Hyokalemia-Resolved   - Repleted     CKD stage III    - Creatinine is 1.2 at baseline, will monitor     History of CVA with no residual deficits   - Continue aspirin      DVT Prophylaxis: Lovenox  Diet: ADULT DIET; Regular;  Low Sodium (2 gm); 1800 ml  Code Status: Full Code  PT/OT Eval Status:     Dispo: Home in 1 to 2 days pending echo findings and need for further work-up      Wiliam Murillo

## 2022-10-11 NOTE — PROGRESS NOTES
BP soft, all other vitals WNL, afebrile  Safety precautions in place  Call light in reach and pt uses appropriately  Pt denies pain   ECHO completed this shift  Pt denies SOB  SpO2 remains WNL   Family at bedside   Updated on care plan  No acute events this shift

## 2022-10-11 NOTE — PLAN OF CARE
Problem: Respiratory - Adult  Goal: Achieves optimal ventilation and oxygenation  10/11/2022 0218 by Mindy Azul RN  Outcome: Progressing  10/10/2022 1856 by Susan Vo RN  Outcome: Progressing  Flowsheets (Taken 10/10/2022 1856)  Achieves optimal ventilation and oxygenation:   Assess for changes in respiratory status   Assess for changes in mentation and behavior   Position to facilitate oxygenation and minimize respiratory effort     Problem: Discharge Planning  Goal: Discharge to home or other facility with appropriate resources  10/10/2022 1856 by Susan Vo RN  Outcome: Progressing     Problem: Safety - Adult  Goal: Free from fall injury  10/11/2022 0218 by Mindy Azul RN  Outcome: Progressing  10/10/2022 1856 by Susan Vo RN  Outcome: Progressing     Problem: Cardiovascular - Adult  Goal: Maintains optimal cardiac output and hemodynamic stability  10/11/2022 0218 by Mindy Azul RN  Outcome: Progressing  10/10/2022 1856 by Susan Vo RN  Outcome: Progressing  Goal: Absence of cardiac dysrhythmias or at baseline  10/10/2022 1856 by Susan Vo RN  Outcome: Progressing     Problem: Metabolic/Fluid and Electrolytes - Adult  Goal: Electrolytes maintained within normal limits  10/10/2022 1856 by Susan Vo RN  Outcome: Progressing  Goal: Hemodynamic stability and optimal renal function maintained  10/10/2022 1856 by Susan Vo RN  Outcome: Progressing     Problem: Chronic Conditions and Co-morbidities  Goal: Patient's chronic conditions and co-morbidity symptoms are monitored and maintained or improved  10/10/2022 1856 by Susan Vo RN  Outcome: Progressing

## 2022-10-11 NOTE — PROGRESS NOTES
CC: hypoxic respiratory failure and CHF  HPI:    Chelsi Giraldo is a 78 y.o. female with no significant past cardiac history who presented to the hospital with shortness of breath. Patient states that over the past few weeks she has noticed progressive shortness of breath upon exertion and now at rest, currently she is on oxygen which is new for her. She has also noticed some lower extremity edema and mild orthopnea. In the emergency department she was noted to have possibly bilateral pulmonary edema along with an elevated BNP in the 6000's. She is feeling a lot better today after receiving IV Lasix. Were consulted for further evaluation. S: Breathing is better. Wants to go home soon. No chest pain     Tele: Sinus, PVC, SVT, NSVT    O:  Physical Exam:  BP 95/63   Pulse 82   Temp 98.1 °F (36.7 °C) (Oral)   Resp 16   Ht 5' 6\" (1.676 m)   Wt 161 lb 4.8 oz (73.2 kg)   SpO2 92%   BMI 26.03 kg/m²    General (appearance):  No acute distress  Eyes: anicteric   Neck: soft, No JVD  Ears/Nose/Mouth/Thorat: No cyanosis  CV: RRR   Respiratory:  Bibasilar crackles  GI: soft, non-tender, non-distended  Skin: Warm, dry. No rashes  Neuro/Psych: Alert and oriented x 3. Appropriate behavior  Ext:  No c/c. + BLE edema  Pulses:  2+ radial     I.O's= -980 ml     Weight  Admission: Weight: 172 lb (78 kg)   Today: Weight: 161 lb 4.8 oz (73.2 kg)    CBC:   Recent Labs     10/08/22  1130   WBC 5.3   HGB 11.3*   HCT 33.6*   MCV 89.5        BMP:   Recent Labs     10/09/22  1139 10/10/22  1030 10/11/22  0745    138 136   K 3.6 3.5 4.1    100 99   CO2 30 29 30   BUN 12 15 18   CREATININE 1.2 1.3* 1.2     Estimated Creatinine Clearance: 36 mL/min (based on SCr of 1.2 mg/dL).     Mag:   Lab Results   Component Value Date/Time    MG 2.30 10/11/2022 07:45 AM     Pro-BNP:   Lab Results   Component Value Date/Time    PROBNP 1,936 10/11/2022 07:45 AM    PROBNP 6,177 10/08/2022 11:30 AM    PROBNP 311 10/28/2017 06:27 PM     CARDIAC ENZYMES:   Recent Labs     10/08/22  1130 10/08/22  2006 10/08/22  2354   TROPONINI <0.01 <0.01 0.02*         Imaging:    10/8/2022 CXR  New bilateral airspace disease and pleural effusions. Assessment:  HF  SVT  NSVT  HTN; BPs soft this admission   HLD  Hx CVA    Plan:  -Keep K>4, Mg>2.  -Echo  -Lasix 20 mg po bid   -Based on echo results to try to start CHF medications. Med titration will be limited d/t soft BPs  -Having NSVT (4-6 beat runs). Consider ischemic eval  -Will order low dose metoprolol.  Hold for SBP< 95 mmHg or HR <60 bpm

## 2022-10-12 LAB
ANION GAP SERPL CALCULATED.3IONS-SCNC: 8 MMOL/L (ref 3–16)
BUN BLDV-MCNC: 20 MG/DL (ref 7–20)
CALCIUM SERPL-MCNC: 9 MG/DL (ref 8.3–10.6)
CHLORIDE BLD-SCNC: 104 MMOL/L (ref 99–110)
CO2: 28 MMOL/L (ref 21–32)
CREAT SERPL-MCNC: 1.2 MG/DL (ref 0.6–1.2)
GFR AFRICAN AMERICAN: 52
GFR NON-AFRICAN AMERICAN: 43
GLUCOSE BLD-MCNC: 102 MG/DL (ref 70–99)
MAGNESIUM: 2.4 MG/DL (ref 1.8–2.4)
POTASSIUM SERPL-SCNC: 4.4 MMOL/L (ref 3.5–5.1)
SODIUM BLD-SCNC: 140 MMOL/L (ref 136–145)

## 2022-10-12 PROCEDURE — C1894 INTRO/SHEATH, NON-LASER: HCPCS

## 2022-10-12 PROCEDURE — 93458 L HRT ARTERY/VENTRICLE ANGIO: CPT

## 2022-10-12 PROCEDURE — 6360000002 HC RX W HCPCS

## 2022-10-12 PROCEDURE — 93458 L HRT ARTERY/VENTRICLE ANGIO: CPT | Performed by: INTERNAL MEDICINE

## 2022-10-12 PROCEDURE — 6360000002 HC RX W HCPCS: Performed by: HOSPITALIST

## 2022-10-12 PROCEDURE — 4A023N7 MEASUREMENT OF CARDIAC SAMPLING AND PRESSURE, LEFT HEART, PERCUTANEOUS APPROACH: ICD-10-PCS | Performed by: INTERNAL MEDICINE

## 2022-10-12 PROCEDURE — 36415 COLL VENOUS BLD VENIPUNCTURE: CPT

## 2022-10-12 PROCEDURE — 6370000000 HC RX 637 (ALT 250 FOR IP): Performed by: INTERNAL MEDICINE

## 2022-10-12 PROCEDURE — 99152 MOD SED SAME PHYS/QHP 5/>YRS: CPT

## 2022-10-12 PROCEDURE — 2580000003 HC RX 258: Performed by: HOSPITALIST

## 2022-10-12 PROCEDURE — 1200000000 HC SEMI PRIVATE

## 2022-10-12 PROCEDURE — 99233 SBSQ HOSP IP/OBS HIGH 50: CPT | Performed by: NURSE PRACTITIONER

## 2022-10-12 PROCEDURE — 6370000000 HC RX 637 (ALT 250 FOR IP)

## 2022-10-12 PROCEDURE — 2580000003 HC RX 258: Performed by: INTERNAL MEDICINE

## 2022-10-12 PROCEDURE — C1769 GUIDE WIRE: HCPCS

## 2022-10-12 PROCEDURE — B2111ZZ FLUOROSCOPY OF MULTIPLE CORONARY ARTERIES USING LOW OSMOLAR CONTRAST: ICD-10-PCS | Performed by: INTERNAL MEDICINE

## 2022-10-12 PROCEDURE — 2709999900 HC NON-CHARGEABLE SUPPLY

## 2022-10-12 PROCEDURE — 2500000003 HC RX 250 WO HCPCS

## 2022-10-12 PROCEDURE — 80048 BASIC METABOLIC PNL TOTAL CA: CPT

## 2022-10-12 PROCEDURE — 6360000004 HC RX CONTRAST MEDICATION: Performed by: INTERNAL MEDICINE

## 2022-10-12 PROCEDURE — 6370000000 HC RX 637 (ALT 250 FOR IP): Performed by: NURSE PRACTITIONER

## 2022-10-12 PROCEDURE — 6370000000 HC RX 637 (ALT 250 FOR IP): Performed by: HOSPITALIST

## 2022-10-12 PROCEDURE — 83735 ASSAY OF MAGNESIUM: CPT

## 2022-10-12 RX ORDER — SODIUM CHLORIDE 0.9 % (FLUSH) 0.9 %
5-40 SYRINGE (ML) INJECTION PRN
Status: DISCONTINUED | OUTPATIENT
Start: 2022-10-12 | End: 2022-10-14 | Stop reason: HOSPADM

## 2022-10-12 RX ORDER — SODIUM CHLORIDE 9 MG/ML
INJECTION, SOLUTION INTRAVENOUS CONTINUOUS
Status: ACTIVE | OUTPATIENT
Start: 2022-10-12 | End: 2022-10-12

## 2022-10-12 RX ORDER — SODIUM CHLORIDE 9 MG/ML
INJECTION, SOLUTION INTRAVENOUS PRN
Status: DISCONTINUED | OUTPATIENT
Start: 2022-10-12 | End: 2022-10-14 | Stop reason: HOSPADM

## 2022-10-12 RX ORDER — SODIUM CHLORIDE 0.9 % (FLUSH) 0.9 %
5-40 SYRINGE (ML) INJECTION EVERY 12 HOURS SCHEDULED
Status: DISCONTINUED | OUTPATIENT
Start: 2022-10-12 | End: 2022-10-14 | Stop reason: HOSPADM

## 2022-10-12 RX ORDER — ACETAMINOPHEN 325 MG/1
650 TABLET ORAL EVERY 4 HOURS PRN
Status: DISCONTINUED | OUTPATIENT
Start: 2022-10-12 | End: 2022-10-12 | Stop reason: SDUPTHER

## 2022-10-12 RX ADMIN — METOPROLOL TARTRATE 25 MG: 25 TABLET, FILM COATED ORAL at 08:24

## 2022-10-12 RX ADMIN — FUROSEMIDE 20 MG: 20 TABLET ORAL at 17:40

## 2022-10-12 RX ADMIN — SODIUM CHLORIDE, PRESERVATIVE FREE 10 ML: 5 INJECTION INTRAVENOUS at 08:34

## 2022-10-12 RX ADMIN — ACETAMINOPHEN 650 MG: 325 TABLET, FILM COATED ORAL at 19:14

## 2022-10-12 RX ADMIN — SODIUM CHLORIDE, PRESERVATIVE FREE 10 ML: 5 INJECTION INTRAVENOUS at 20:24

## 2022-10-12 RX ADMIN — ENOXAPARIN SODIUM 40 MG: 100 INJECTION SUBCUTANEOUS at 20:23

## 2022-10-12 RX ADMIN — ASPIRIN 81 MG 81 MG: 81 TABLET ORAL at 08:24

## 2022-10-12 RX ADMIN — IOHEXOL 150 ML: 350 INJECTION, SOLUTION INTRAVENOUS at 13:31

## 2022-10-12 RX ADMIN — FUROSEMIDE 20 MG: 20 TABLET ORAL at 08:24

## 2022-10-12 RX ADMIN — SODIUM CHLORIDE: 9 INJECTION, SOLUTION INTRAVENOUS at 15:17

## 2022-10-12 RX ADMIN — SODIUM CHLORIDE, PRESERVATIVE FREE 10 ML: 5 INJECTION INTRAVENOUS at 05:45

## 2022-10-12 ASSESSMENT — PAIN SCALES - GENERAL
PAINLEVEL_OUTOF10: 3
PAINLEVEL_OUTOF10: 0
PAINLEVEL_OUTOF10: 0

## 2022-10-12 ASSESSMENT — PAIN DESCRIPTION - LOCATION: LOCATION: OTHER (COMMENT)

## 2022-10-12 NOTE — PLAN OF CARE
Problem: Respiratory - Adult  Goal: Achieves optimal ventilation and oxygenation  Outcome: Progressing     Problem: Safety - Adult  Goal: Free from fall injury  Outcome: Progressing     Problem: Metabolic/Fluid and Electrolytes - Adult  Goal: Electrolytes maintained within normal limits  Outcome: Progressing     Problem: Metabolic/Fluid and Electrolytes - Adult  Goal: Hemodynamic stability and optimal renal function maintained  Outcome: Progressing     Problem: Skin/Tissue Integrity  Goal: Absence of new skin breakdown  Outcome: Progressing    Medications per order, patient is NPO for coronary angiogram with Dr. iWllow Garner this afternoon/due for Cath Lab at 1500. Will continue to monitor.

## 2022-10-12 NOTE — PROGRESS NOTES
CC: hypoxic respiratory failure and CHF  HPI:    Libra Church is a 78 y.o. female with no significant past cardiac history who presented to the hospital with shortness of breath. Patient states that over the past few weeks she has noticed progressive shortness of breath upon exertion and now at rest, currently she is on oxygen which is new for her. She has also noticed some lower extremity edema and mild orthopnea. In the emergency department she was noted to have possibly bilateral pulmonary edema along with an elevated BNP in the 6000's. She is feeling a lot better today after receiving IV Lasix. Were consulted for further evaluation. S: Denies cp or sob. Tele: Sinus, PVC, SVT, NSVT    O:  Physical Exam:  /72   Pulse 73   Temp 97.9 °F (36.6 °C) (Oral)   Resp 16   Ht 5' 6\" (1.676 m)   Wt 162 lb 3.2 oz (73.6 kg)   SpO2 97%   BMI 26.18 kg/m²    General (appearance):  No acute distress  Eyes: anicteric   Neck: soft, No JVD  Ears/Nose/Mouth/Thorat: No cyanosis  CV: RRR   Respiratory:  Diminished. GI: soft, non-tender, non-distended  Skin: Warm, dry. No rashes  Neuro/Psych: Alert and oriented x 3. Appropriate behavior  Ext:  No c/c. + BLE edema  Pulses:  2+ radial     I.O's= -     Weight  Admission: Weight: 172 lb (78 kg)   Today: Weight: 162 lb 3.2 oz (73.6 kg)    CBC:   No results for input(s): WBC, HGB, HCT, MCV, PLT in the last 72 hours. BMP:   Recent Labs     10/10/22  1030 10/11/22  0745 10/12/22  0651    136 140   K 3.5 4.1 4.4    99 104   CO2 29 30 28   BUN 15 18 20   CREATININE 1.3* 1.2 1.2     Estimated Creatinine Clearance: 36 mL/min (based on SCr of 1.2 mg/dL).     Mag:   Lab Results   Component Value Date/Time    MG 2.40 10/12/2022 06:51 AM     Pro-BNP:   Lab Results   Component Value Date/Time    PROBNP 1,936 10/11/2022 07:45 AM    PROBNP 6,177 10/08/2022 11:30 AM    PROBNP 311 10/28/2017 06:27 PM     Imaging:    10/11/2022 Coronary angiogram   Left ventricular cavity size is normal. There is moderate concentric left   ventricular hypertrophy. Overall left ventricular systolic function appears severely reduced with an   ejection fraction of 20%. There is severe diffuse hypokinesis. Diastolic filling parameters suggest grade II diastolic dysfunction. Global strain is -4.1%. Moderate mitral regurgitation is present. Moderate tricuspid regurgitation. Estimated pulmonary artery systolic pressure is at 53 mmHg assuming a right atrial pressure of 3 mmHg. The right ventricle is enlarged and hypokinetic. TAPSE measures 1.63 cm and the RVS velocity measures 8.76 cm/s. The left atrium is mildly dilated. 10/8/2022 CXR  New bilateral airspace disease and pleural effusions. Assessment:  HFrEF  SVT  NSVT  HTN; BPs soft this admission   HLD  Hx CVA    Plan:  -Keep K>4, Mg>2.  -Echo showed EF 20%, NSVT on tele yesterday.  Will get coronary angiogram with Dr Pako Vitale this afternoon  -ASA  -Metoprolol    -Lasix 20 mg po bid   -Titrating CHF medications will be limited d/t soft BP

## 2022-10-12 NOTE — PROGRESS NOTES
Patient arrived from cath lab. Right radial site CDI with splint in place. Bed rest orders for 4 hours, pulses palpable, denies pain, numbness, or tingling, VSS, Call light in reach. Patient reminded of bed rest and not to move arm or put any pressure on arm.

## 2022-10-12 NOTE — CARE COORDINATION
Patient in for [facility] today. Patient on bed rest for post LHC. Patient's grandson is in room and he sates he will be with patient at discharge. Patient declines any HHC and states that she has no other needs. Explained to patient that if she is unable to wean off oxygen entirely, she may go home with O2. Patient stated understanding. Patient c/o nausea and this CM knew that patient is NPO at present for dxstic test. This CM told Husam dominique RN (patient;' nurse) of nausea. Patient will be transported home by [facility] at discharge.  Electronically signed by Mouna Griffith RN on 10/12/2022 at 5:40 PM

## 2022-10-12 NOTE — ANESTHESIA PRE-OP
Brief Pre-Op Note/Sedation Assessment      Ever Moore  1940  [id number]  1:42 PM    Planned Procedure: Cardiac Catheterization Procedure  Post Procedure Plan: Return to same level of care  Consent: I have discussed with the patient and/or the patient representative the indication, alternatives, and the possible risks and/or complications of the planned procedure and the anesthesia methods. The patient and/or patient representative appear to understand and agree to proceed. Chief Complaint:   Dyspnea  Fatigue      Indications for Cath Procedure:  Presentation:  Cardiomyopathy  2. Anginal Classification within 2 weeks:  No symptoms  3. Angina Symptoms Assessment:  Atypical Chest Pain  4. Heart Failure Class within last 2 weeks:  No symptoms  5. Cardiovascular Instability:  No    Prior Ischemic Workup/Eval:  Pre-Procedural Medications: Yes: Aspirin and STATIN  2. Stress Test Completed? No    Does Patient need surgery?   Cath Valve Surgery:  No    Pre-Procedure Medical History:  Vital Signs:  /86   Pulse 77   Temp 98.3 °F (36.8 °C) (Oral)   Resp 18   Ht 5' 6\" (1.676 m)   Wt 162 lb 3.2 oz (73.6 kg)   SpO2 95%   BMI 26.18 kg/m²     Allergies:  No Known Allergies  Medications:    Current Facility-Administered Medications   Medication Dose Route Frequency Provider Last Rate Last Admin    furosemide (LASIX) tablet 20 mg  20 mg Oral BID Yulissa Chen MD   20 mg at 10/12/22 0824    metoprolol tartrate (LOPRESSOR) tablet 25 mg  25 mg Oral BID JESSICA Bhatia CNP   25 mg at 10/12/22 0824    potassium chloride (KLOR-CON M) extended release tablet 40 mEq  40 mEq Oral PRN Janett Kumar MD        Or    potassium bicarb-citric acid (EFFER-K) effervescent tablet 40 mEq  40 mEq Oral PRN Janett Kumar MD        Or    potassium chloride 10 mEq/100 mL IVPB (Peripheral Line)  10 mEq IntraVENous PRN Janett Kumar MD        magnesium sulfate 2000 mg in 50 mL IVPB premix  2,000 mg IntraVENous PRN Odalis Galindo MD        aspirin chewable tablet 81 mg  81 mg Oral Daily Odalis Glaindo MD   81 mg at 10/12/22 4825    sodium chloride flush 0.9 % injection 5-40 mL  5-40 mL IntraVENous 2 times per day Odalis Galindo MD   10 mL at 10/12/22 2468    sodium chloride flush 0.9 % injection 5-40 mL  5-40 mL IntraVENous PRN Odalis Galindo MD        0.9 % sodium chloride infusion   IntraVENous PRN Odalis Galindo MD        ondansetron (ZOFRAN-ODT) disintegrating tablet 4 mg  4 mg Oral Q8H PRN Odalis Galindo MD        Or    ondansetron [facility]) injection 4 mg  4 mg IntraVENous Q6H PRN Odalis Galindo MD        polyethylene glycol [facility]) packet 17 g  17 g Oral Daily PRN Odalis Galindo MD   17 g at 10/10/22 1831    acetaminophen (TYLENOL) tablet 650 mg  650 mg Oral Q6H PRN Odalis Galindo MD   650 mg at 10/10/22 1825    Or    acetaminophen (TYLENOL) suppository 650 mg  650 mg Rectal Q6H PRN Odalis Galindo MD        enoxaparin (LOVENOX) injection 40 mg  40 mg SubCUTAneous Daily Odalis Galindo MD   40 mg at 10/11/22 2114    perflutren lipid microspheres (DEFINITY) injection 1.65 mg  1.5 mL IntraVENous ONCE PRN Odalis Galindo MD           Past Medical History:    Past Medical History:   Diagnosis Date    Asymptomatic gallstones 04/21/2020    CHF (congestive heart failure) (HCC)     CVA (cerebral infarction) 2007    neg carotids, nl echocardiogram    Hyperlipidemia     Hypertension     Pulmonary nodule        Surgical History:    Past Surgical History:   Procedure Laterality Date    KNEE ARTHROSCOPY               Pre-Sedation:  Pre-Sedation Documentation and Exam:  I have personally completed a history, physical exam & review of systems for this patient (see notes).     Prior History of Anesthesia Complications:   none    Modified Mallampati:  I (soft palate, uvula, fauces, tonsillar pillars visible)    ASA Classification:  Class 3 - A patient with severe systemic disease that limits activity but is not incapacitating    Stephon Scale: Activity:  2 - Able to move 4 extremities voluntarily on command  Respiration:  2 - Able to breathe deeply and cough freely  Circulation:  2 - BP+/- 20mmHg of normal  Consciousness:  2 - Fully awake  Oxygen Saturation (color):  2 - Able to maintain oxygen saturation >92% on room air    Sedation/Anesthesia Plan:  Guard the patient's safety and welfare. Minimize physical discomfort and pain. Minimize negative psychological responses to treatment by providing sedation and analgesia and maximize the potential amnesia. Patient to meet pre-procedure discharge plan.     Medication Planned:  midazolam intravenously and fentanyl intravenously    Patient is an appropriate candidate for plan of sedation:   yes      Electronically signed by Malou Valladares MD on 10/12/2022 at 1:42 PM

## 2022-10-12 NOTE — PROGRESS NOTES
Progress Note        Date:10/12/2022       TX:[id number]  Patient Name:Cheryl Espinoza     YOB: 1940     Age:82 y.o. Chief Complaint   Patient presents with    Shortness of Breath     Pt states she has been SOB on and off for a month            Subjective   Interval History Status: improved. Overnight became short of breath while sleeping  This morning feels better  Echocardiogram showed low ejection fraction, discussed with the patient  Denies any chest pain        Review of Systems   ROS as mentioned above. Medications   Scheduled Meds:    furosemide  20 mg Oral BID    metoprolol tartrate  25 mg Oral BID    aspirin  81 mg Oral Daily    sodium chloride flush  5-40 mL IntraVENous 2 times per day    enoxaparin  40 mg SubCUTAneous Daily     Continuous Infusions:    sodium chloride       PRN Meds: potassium chloride **OR** potassium alternative oral replacement **OR** potassium chloride, magnesium sulfate, sodium chloride flush, sodium chloride, ondansetron **OR** ondansetron, polyethylene glycol, acetaminophen **OR** acetaminophen, perflutren lipid microspheres    Past History    Past Medical History:   has a past medical history of Asymptomatic gallstones, CHF (congestive heart failure) ([address] CVA (cerebral infarction), Hyperlipidemia, Hypertension, and Pulmonary nodule. Social History:   reports that she has never smoked. She has never used smokeless tobacco. She reports that she does not drink alcohol and does not use drugs.      Family History:   Family History   Problem Relation Age of Onset    High Blood Pressure Father     Stroke Father     Stroke Sister     Cancer Brother         lung    Cancer Brother         colon       Physical Examination      Vitals:  /72   Pulse 73   Temp 97.9 °F (36.6 °C) (Oral)   Resp 16   Ht 5' 6\" (1.676 m)   Wt 162 lb 3.2 oz (73.6 kg)   SpO2 97%   BMI 26.18 kg/m²   Temp (24hrs), Av.8 °F (36.6 °C), Min:97.3 °F (36.3 °C), Max:98.3 °F (36.8 °C)      I/O (24Hr): No intake or output data in the 24 hours ending 10/12/22 1143    Physical Exam  Constitutional:       Appearance: Normal appearance. She is ill-appearing. HENT:      Head: Normocephalic and atraumatic. Cardiovascular:      Rate and Rhythm: Normal rate and regular rhythm. Pulmonary:      Effort: Pulmonary effort is normal.      Breath sounds: Rales present. Abdominal:      General: Abdomen is flat. Palpations: Abdomen is soft. Musculoskeletal:         General: No swelling or tenderness. Normal range of motion. Skin:     General: Skin is warm and dry. Neurological:      General: No focal deficit present. Mental Status: She is alert and oriented to person, place, and time. Psychiatric:         Mood and Affect: Mood normal.         Behavior: Behavior normal.               Assessment      Principal Problem:    Acute systolic heart failure (HCC)  Active Problems:    SVT (supraventricular tachycardia) (HCC)    NSVT (nonsustained ventricular tachycardia)  Resolved Problems:    * No resolved hospital problems.  *       Plan:          Acute systolic heart failure  Continue with Lasix 20 mg p.o. twice daily  Continue with low-dose metoprolol  BP too low to tolerate ACE/ARB and/or Entresto  Ischemic work-up per cardiology    History of CVA with no residual deficits  Continue with home aspirin    CKD stage III  Creatinine appears to be close to baseline      Disposition:  Pending improvement in shortness of breath  Pending ischemic work-up    Kia Graza MD  11:43 AM  10/12/22

## 2022-10-12 NOTE — PLAN OF CARE
Problem: Safety - Adult  Goal: Free from fall injury  Outcome: Progressing     Problem: Chronic Conditions and Co-morbidities  Goal: Patient's chronic conditions and co-morbidity symptoms are monitored and maintained or improved  Outcome: Progressing     Problem: Skin/Tissue Integrity  Goal: Absence of new skin breakdown  Description: 1. Monitor for areas of redness and/or skin breakdown  2. Assess vascular access sites hourly  3. Every 4-6 hours minimum:  Change oxygen saturation probe site  4. Every 4-6 hours:  If on nasal continuous positive airway pressure, respiratory therapy assess nares and determine need for appliance change or resting period.   Outcome: Progressing

## 2022-10-12 NOTE — PROCEDURES
[facility]                                                LEFT HEART CATH    Cheryl Olivarez    78 y.o., female  1940      10/12/2022    Procedure performed by Dr. Brandon Jarrett MD, [facility]  Surgical assistants :none    Procedure  Selective Coronary Angiography  Cardiac Catheterization for Coronary Anatomy  Left Heart Catheterization  Left Ventriculogram  Radial artery access assisted by ultrasound  Arterial Access Right Radial Artery after a negative Parvez test  TR Band    CPT code 11789  CPT code 32099  CPT code 12274  CPT code 42872 at 1 unit    Any and all anesthesia was administered by my staff under my direct supervision and with me personally monitoring the patient    Indication:Cardiac cath to rule out ischemic CAD, Possible angioplasty, The procedure and risks described to patient including risk of CVA, MI, bleeding, emergency surgery, death, patient with heart failure and low ejection fraction. As by Dr. Jyothi Cantu to perform cardiac catheterization. 1313, Consent signed, or positive stress test  Unspecified Angina  Anesthesia: Moderate sedation with Versed and Fentanyl IV  Anesthesia Start time 1843  Anesthesia end time 1330  Estimated blood loss :minimal    Specimen removed: NONE    Abnormal Stress Test      Procedure Description:  After written informed consent was obtained, the patient was   brought to the cardiac catheterization suite, where patient was prepped and   draped in the usual sterile fashion. Local anesthesia was achieved in the   right wrist with 2% lidocaine. A 5-Slovak hemostasis sheath was placed into   the radial artery.     The pre cocktail of heparin, verapamil and nitroglycerin was injected into the sheath    The JR4 catheter was introduced  to engage the right coronary   artery. Radiographic  images were obtained. The catheter was removed and exchanged over an exchange length 0.35 soft guide wire. .         A 5-Iranian JL 3.5 catheter was introduced; used to engage the left main   coronary artery. Radiographic  images were obtained. The catheter was pulled back . The JR 4 diagnostic catheter was placed across the aortic valve to the left ventricle where pressure determinations were made. There was no gradient pullback across the aortic valve. We did not perform an LV gram.      The hemostasis sheath was removed and hemostasis was achieved using a TR Band     There were no complications. Patient tolerated the procedure well. The   patient was transferred to the holding area in stable condition. Contrast consumed 15  Flouro time 4 minutes  Radiation exposure 220.56 mGy    Results:  Left ventricular pressure 15 mmHg  Aortic pressure 109/78 mmHg    Coronary anatomy:   The left main coronary artery is normal.     Left anterior descending artery is normal    Circumflex artery is normal.    The right coronary artery is a dominant vessel and normal.     Left ventriculogram was not performed. EDP was 15. Impression:  No evidence   coronary artery disease  LV gram not performed. EDP was 15 mm. Complications: none      Plan:  Continue current medical management. Diuretics as necessary. Dr. Griselda Buss will resume cardiac care.     This note was likely completed using voice recognition technology and may contain unintended errors  Jeannette Cash MD , Adele Mendoza; So Huerta MD

## 2022-10-13 LAB
ANION GAP SERPL CALCULATED.3IONS-SCNC: 8 MMOL/L (ref 3–16)
BUN BLDV-MCNC: 19 MG/DL (ref 7–20)
CALCIUM SERPL-MCNC: 8.9 MG/DL (ref 8.3–10.6)
CHLORIDE BLD-SCNC: 103 MMOL/L (ref 99–110)
CO2: 26 MMOL/L (ref 21–32)
CREAT SERPL-MCNC: 1.1 MG/DL (ref 0.6–1.2)
GFR AFRICAN AMERICAN: 57
GFR NON-AFRICAN AMERICAN: 47
GLUCOSE BLD-MCNC: 99 MG/DL (ref 70–99)
MAGNESIUM: 2.3 MG/DL (ref 1.8–2.4)
POTASSIUM SERPL-SCNC: 4.2 MMOL/L (ref 3.5–5.1)
PRO-BNP: 3632 PG/ML (ref 0–449)
SODIUM BLD-SCNC: 137 MMOL/L (ref 136–145)

## 2022-10-13 PROCEDURE — 97161 PT EVAL LOW COMPLEX 20 MIN: CPT

## 2022-10-13 PROCEDURE — 80048 BASIC METABOLIC PNL TOTAL CA: CPT

## 2022-10-13 PROCEDURE — 83880 ASSAY OF NATRIURETIC PEPTIDE: CPT

## 2022-10-13 PROCEDURE — 97165 OT EVAL LOW COMPLEX 30 MIN: CPT

## 2022-10-13 PROCEDURE — 6360000002 HC RX W HCPCS: Performed by: INTERNAL MEDICINE

## 2022-10-13 PROCEDURE — 2580000003 HC RX 258: Performed by: HOSPITALIST

## 2022-10-13 PROCEDURE — 6370000000 HC RX 637 (ALT 250 FOR IP): Performed by: NURSE PRACTITIONER

## 2022-10-13 PROCEDURE — 6370000000 HC RX 637 (ALT 250 FOR IP): Performed by: HOSPITALIST

## 2022-10-13 PROCEDURE — 36415 COLL VENOUS BLD VENIPUNCTURE: CPT

## 2022-10-13 PROCEDURE — 97530 THERAPEUTIC ACTIVITIES: CPT

## 2022-10-13 PROCEDURE — 6360000002 HC RX W HCPCS: Performed by: HOSPITALIST

## 2022-10-13 PROCEDURE — 83735 ASSAY OF MAGNESIUM: CPT

## 2022-10-13 PROCEDURE — 97535 SELF CARE MNGMENT TRAINING: CPT

## 2022-10-13 PROCEDURE — 1200000000 HC SEMI PRIVATE

## 2022-10-13 PROCEDURE — 2580000003 HC RX 258: Performed by: INTERNAL MEDICINE

## 2022-10-13 PROCEDURE — 99233 SBSQ HOSP IP/OBS HIGH 50: CPT | Performed by: NURSE PRACTITIONER

## 2022-10-13 PROCEDURE — 97116 GAIT TRAINING THERAPY: CPT

## 2022-10-13 PROCEDURE — 6370000000 HC RX 637 (ALT 250 FOR IP): Performed by: INTERNAL MEDICINE

## 2022-10-13 RX ADMIN — SACUBITRIL AND VALSARTAN 0.5 TABLET: 24; 26 TABLET, FILM COATED ORAL at 07:55

## 2022-10-13 RX ADMIN — FUROSEMIDE 2.5 MG/HR: 10 INJECTION INTRAMUSCULAR; INTRAVENOUS at 09:57

## 2022-10-13 RX ADMIN — SODIUM CHLORIDE, PRESERVATIVE FREE 10 ML: 5 INJECTION INTRAVENOUS at 08:38

## 2022-10-13 RX ADMIN — ASPIRIN 81 MG 81 MG: 81 TABLET ORAL at 07:56

## 2022-10-13 RX ADMIN — ENOXAPARIN SODIUM 40 MG: 100 INJECTION SUBCUTANEOUS at 21:49

## 2022-10-13 RX ADMIN — ACETAMINOPHEN 650 MG: 325 TABLET, FILM COATED ORAL at 12:12

## 2022-10-13 RX ADMIN — METOPROLOL TARTRATE 25 MG: 25 TABLET, FILM COATED ORAL at 21:49

## 2022-10-13 RX ADMIN — FUROSEMIDE 20 MG: 20 TABLET ORAL at 07:56

## 2022-10-13 NOTE — PROGRESS NOTES
Occupational Therapy  Facility/Department: [address]  Occupational Therapy Initial Assessment/Discharge Summary    Name: Michael Reyna  : 1940  MRN: [id number]  Date of Service: 10/13/2022    Discharge Recommendations:   Michael Reyna scored a 19/21 on the AM-PAC ADL Inpatient form. Current research shows that an AM-PAC score of 18 or greater is typically associated with a discharge to the patient's home setting. Based on the patient's AM-PAC score, further OT tx is not necessary. Please see assessment section for further patient specific details. If patient discharges prior to next session this note will serve as a discharge summary. Please see below for the latest assessment towards goals. OT Equipment Recommendations  Equipment Needed: No       Patient Diagnosis(es): The encounter diagnosis was Congestive heart failure, unspecified HF chronicity, unspecified heart failure type ([address] Past Medical History:  has a past medical history of Asymptomatic gallstones, CHF (congestive heart failure) ([address] CVA (cerebral infarction), Hyperlipidemia, Hypertension, and Pulmonary nodule. Past Surgical History:  has a past surgical history that includes Knee arthroscopy. Assessment     Assessment: Pt is an 79 y/o female admitted d/t acute systolic heart failure s/p [facility] 10/12. Pt reports being independent with ADLs and functional transfers at baseline. Pt able to perform toileting and functional transfers/mobility around room without an AD. Pt does not require further OT intervention. OT to sign off. No Skilled OT: Independent with functional mobility; Independent with ADL's;Safe to return home; At baseline function  Activity Tolerance  Activity Tolerance: Patient Tolerated treatment well  Activity Tolerance Comments: Pt tolerated session on room air, maintaining O2 sats 93-95% during activity.         Plan   Occupational Therapy Plan  Additional Comments: D/C OT Restrictions  Position Activity Restriction  Other position/activity restrictions: Up as tolerated    Subjective   General  Chart Reviewed: Yes  Patient assessed for rehabilitation services?: Yes  Referring Practitioner: Jose Williamson MD  Subjective  Subjective: Pt received semi supine in bed and agreeable to OT eval.  General Comment  Comments: Pt denied pain. Social/Functional History  Social/Functional History  Lives With: Family (granddaughter, grandson and daughter live around the corner)  Type of Home: House  Home Layout: Two level (laundry in basement;bed and bath on 2nd level)  Home Access: Stairs to enter with rails (4 FILOMENA B HR)  Entrance Stairs - Number of Steps: 4  Entrance Stairs - Rails: Both  Bathroom Shower/Tub: Tub/Shower unit  Bathroom Toilet: Handicap height  Home Equipment: Cane  Has the patient had two or more falls in the past year or any fall with injury in the past year?: No  Receives Help From: Family  ADL Assistance: Independent  Homemaking Assistance:  (pt was indep with meals, daughter helpes with laundry)  Homemaking Responsibilities:  (pt's daughter performs grandson can also perform)  Ambulation Assistance: Independent  Transfer Assistance: Independent  Active : Yes (daughter drives her to appointments)       Objective   Heart Rate: 78  Heart Rate Source: Monitor  BP: 93/67  BP Location: Left upper arm  BP Method: Automatic  Patient Position: Semi fowlers  MAP (Calculated): 75.67  Resp: 18  SpO2: 97 %  O2 Device: None (Room air)          Observation/Palpation  Observation: Pt on . 5L O2 via NC upon OT arrival. Pt maintained O2 sats 93-95% throughout session on RA. RN aware and advised to leave pt on RA at end of session. Safety Devices  Type of Devices: Left in bed;Bed alarm in place;Call light within reach;Nurse notified; All fall risk precautions in place     Toilet Transfers  Toilet - Technique: Ambulating  Equipment Used: Standard toilet (using grab bar)  Toilet Transfer: Modified independent  AROM: Within functional limits  Strength: Within functional limits  Coordination: Within functional limits  ADL  Grooming: Independent  LE Dressing: Modified independent   Toileting: Modified independent      Activity Tolerance  Activity Tolerance: Patient tolerated treatment well  Activity Tolerance Comments: Pt on Room Air, SpO2 after exertion 93-97%  Bed mobility  Rolling to Right: Modified independent  Supine to Sit: Modified independent  Sit to Supine: Modified independent  Scooting: Modified independent  Transfers  Sit to stand: Independent  Stand to sit:  Independent  Vision  Vision: Within Functional Limits  Hearing  Hearing: Within functional limits  Cognition  Overall Cognitive Status: WNL  Orientation  Overall Orientation Status: Within Normal Limits  Orientation Level: Oriented X4                  Education Given To: Patient  Education Provided: Role of Therapy;Transfer Training;Plan of Care  Education Method: Demonstration  Barriers to Learning: None  Education Outcome: Verbalized understanding                        AM-PAC Score        AM-[facility] Inpatient Daily Activity Raw Score: 24 (10/13/22 1313)  AM-PAC Inpatient ADL T-Scale Score : 57.54 (10/13/22 1313)  ADL Inpatient CMS 0-100% Score: 0 (10/13/22 1313)  ADL Inpatient CMS G-Code Modifier : [address] (10/13/22 1313)      Therapy Time   Individual Concurrent Group Co-treatment   Time In 7755         Time Out 4701         Minutes 39         Timed Code Treatment Minutes: Radha Pressley 284, OT

## 2022-10-13 NOTE — CARE COORDINATION
CM  following for  d/c planning:    -   Patient S/P LHC yest: . -   Patient's grandson is in room and he states he will be with patient at discharge. -   Patient declines any HHC and states that she has no other needs. -   Explained to patient that if she is unable to wean off oxygen , she may go home with O2. Patient acknowledged:    -   Patient will be transported home by grandson at discharge. Per MD Documentation:  Disposition:  Pending improvement in shortness of breath  Pending ischemic work-up, cardiology following  S/P  LHC yest  .     Electronically signed by Stephany Bruner RN on 10/13/2022 at 10:56 AM         Stephany Bruner RN Case Manager  [facility]  [address] [place] [address].   [facility] [address]  [phone]  Fax [phone]

## 2022-10-13 NOTE — PROGRESS NOTES
07:45 AM    PROBNP 6,177 10/08/2022 11:30 AM    PROBNP 311 10/28/2017 06:27 PM     Imaging:    10/12/2022 [facility]  Results:  Left ventricular pressure 15 mmHg  Aortic pressure 109/78 mmHg  Coronary anatomy:   The left main coronary artery is normal.   Left anterior descending artery is normal  Circumflex artery is normal.  The right coronary artery is a dominant vessel and normal.   Left ventriculogram was not performed. EDP was 15. Impression:  No evidence   coronary artery disease  LV gram not performed. EDP was 15 mm.    10/11/2022 Coronary angiogram   Left ventricular cavity size is normal. There is moderate concentric left   ventricular hypertrophy. Overall left ventricular systolic function appears severely reduced with an   ejection fraction of 20%. There is severe diffuse hypokinesis. Diastolic filling parameters suggest grade II diastolic dysfunction. Global strain is -4.1%. Moderate mitral regurgitation is present. Moderate tricuspid regurgitation. Estimated pulmonary artery systolic pressure is at 53 mmHg assuming a right atrial pressure of 3 mmHg. The right ventricle is enlarged and hypokinetic. TAPSE measures 1.63 cm and the RVS velocity measures 8.76 cm/s. The left atrium is mildly dilated. 10/8/2022 CXR  New bilateral airspace disease and pleural effusions. Assessment:  HFrEF  NICM: No CAD seen on [facility]   SVT  NSVT  HTN; BPs soft this admission   HLD  Hx CVA    Plan:  -Keep K>4, Mg>2.  -Lasix gtt  -Metoprolol Hold for SBP< 100 mmHg or HR <60 bpm  -Holding entresto while getting diuresis. Will try to add prior to d/c or in office for follow up. -ASA  -Titrating CHF medications will be limited d/t soft BP  -Strict I/O, daily weights, low salt diet    Will try to start MRA and SGLT2 in follow up.

## 2022-10-13 NOTE — PROGRESS NOTES
Hospitalist Progress Note      PCP: Case Mendoza MD    Date of Admission: 10/8/2022      Subjective:     Patient reporting shortness of breath when OOB but none at rest, no chest pain. Medications:  Reviewed    Infusion Medications    furosemide (LASIX) 1mg/ml infusion 2.5 mg/hr (10/13/22 0957)    sodium chloride      sodium chloride       Scheduled Medications    sodium chloride flush  5-40 mL IntraVENous 2 times per day    [Held by provider] sacubitril-valsartan  0.5 tablet Oral BID    metoprolol tartrate  25 mg Oral BID    aspirin  81 mg Oral Daily    sodium chloride flush  5-40 mL IntraVENous 2 times per day    enoxaparin  40 mg SubCUTAneous Daily     PRN Meds: sodium chloride flush, sodium chloride, potassium chloride **OR** potassium alternative oral replacement **OR** potassium chloride, magnesium sulfate, sodium chloride flush, sodium chloride, ondansetron **OR** ondansetron, polyethylene glycol, acetaminophen **OR** acetaminophen, perflutren lipid microspheres      Intake/Output Summary (Last 24 hours) at 10/13/2022 1055  Last data filed at 10/13/2022 0759  Gross per 24 hour   Intake 737 ml   Output 800 ml   Net -63 ml       Physical Exam Performed:    /76   Pulse 79   Temp 98 °F (36.7 °C) (Oral)   Resp 18   Ht 5' 6\" (1.676 m)   Wt 162 lb 6.4 oz (73.7 kg)   SpO2 99%   BMI 26.21 kg/m²     General appearance: No apparent distress, appears stated age and cooperative. HEENT: Pupils equal, round, and reactive to light. Conjunctivae/corneas clear. Neck: Supple, with full range of motion. No jugular venous distention. Trachea midline. Respiratory:  Bibasilar crackles, normal respiratory effort  Cardiovascular: Regular rate and rhythm with normal S1/S2 without murmurs, rubs or gallops. Abdomen: Soft, non-tender, non-distended with normal bowel sounds. Musculoskeletal: No clubbing, cyanosis or edema bilaterally. Full range of motion without deformity.   Skin: Skin color, texture, turgor normal.  No rashes or lesions. Neurologic:  Neurovascularly intact without any focal sensory/motor deficits. Cranial nerves: II-XII intact, grossly non-focal.  Psychiatric: Alert and oriented, thought content appropriate, normal insight  Capillary Refill: Brisk, 3 seconds, normal   Peripheral Pulses: +2 palpable, equal bilaterally       Labs:   No results for input(s): WBC, HGB, HCT, PLT in the last 72 hours. Recent Labs     10/11/22  0745 10/12/22  0651 10/13/22  0706    140 137   K 4.1 4.4 4.2   CL 99 104 103   CO2 30 28 26   BUN 18 20 19   CREATININE 1.2 1.2 1.1   CALCIUM 9.1 9.0 8.9     No results for input(s): AST, ALT, BILIDIR, BILITOT, ALKPHOS in the last 72 hours. No results for input(s): INR in the last 72 hours. No results for input(s): Dianna Ellyels in the last 72 hours. Urinalysis:    No results found for: Bladimir Whitehead, BACTERIA, RBCUA, BLOODU, SPECGRAV, GLUCOSEU    Radiology:  XR CHEST (2 VW)   Final Result   Impression:   New bilateral airspace disease and pleural effusions. Assessment/Plan:    Acute decompensated heart failure, unspecified type   - Lasix gtt    - Strict I&Os    - Echo showed EF 20% with AS and LVH   - LHC showed no evidence of CAD   - Cardiology consulted, appreciate recommendations   - Continue metoprolol, hold Entresto while diuresing, GDMT limited by low BP     Essential hypertension   - Historical diagnosis, not on medication at home, BP on lower end IP, will monitor     Hyokalemia-Resolved   - Repleted     CKD stage III    - Creatinine is 1.2 at baseline, will monitor     History of CVA with no residual deficits   - Continue aspirin      DVT Prophylaxis: Lovenox  Diet: ADULT DIET;  Regular  Code Status: Full Code  PT/OT Eval Status:     Dispo: Home, pending diuresis      [place]

## 2022-10-13 NOTE — PROGRESS NOTES
Physical Therapy  Facility/Department: [address] [place] [address]  Physical Therapy Initial Assessment    Name: Ever Moore  : 1940  MRN: [id number]  Date of Service: 10/13/2022    Discharge Recommendations:Cheryl Espinoza scored a 23/24 on the AM-PAC short mobility form. At this time, no further PT is recommended upon discharge. Recommend patient returns to prior setting with prior services. PT Equipment Recommendations  Equipment Needed: No      Patient Diagnosis(es): The encounter diagnosis was Congestive heart failure, unspecified HF chronicity, unspecified heart failure type ([address] Past Medical History:  has a past medical history of Asymptomatic gallstones, CHF (congestive heart failure) ([facility] [address] CVA (cerebral infarction), Hyperlipidemia, Hypertension, and Pulmonary nodule. Past Surgical History:  has a past surgical history that includes Knee arthroscopy. Assessment   Assessment: Pt demo all functional mobility, transfers and ambulation at reported baseline. Pt reports no acute therapy needs at this time. WIll sign off. Therapy Prognosis: Excellent  Decision Making: Low Complexity  Requires PT Follow-Up: No  Activity Tolerance  Activity Tolerance: Patient tolerated treatment well  Activity Tolerance Comments: Pt on Room Air, SpO2 after exertion 93-97%     Plan   Physcial Therapy Plan  General Plan: Discharge with evaluation only  Safety Devices  Type of Devices: Left in bed, Bed alarm in place, Call light within reach, Nurse notified, All fall risk precautions in place (Family in the room)     Restrictions  Position Activity Restriction  Other position/activity restrictions: Up as tolerated     Subjective   General  Chart Reviewed: Yes  Patient assessed for rehabilitation services?: Yes  Additional Pertinent Hx: Pt is a 79 yo female that presents to the [place] [address] 10/8 with co of worsening shortness of breath. CXR:New bilateral airspace disease and pleural effusions.  Pt underwent aCardiac Catheterization Procedure on 10/12. PMH: Hypertension, CHF, CVA  Referring Practitioner: Hardy Guerrier MD  Referral Date : 10/13/22  Diagnosis: Acute systolic heart failure  Subjective  Subjective: Pt found supine in bed. Agreeable to therapy, grandson and daughter in the room. Social/Functional History  Social/Functional History  Lives With: Family (granddaughter, grandson and daughter live around the corner)  Type of Home: House  Home Layout: Two level (laundry in basement;bed and bath on 2nd level)  Home Access: Stairs to enter with rails (4 FILOMENA B HR)  Entrance Stairs - Number of Steps: 4  Entrance Stairs - Rails: Both  Bathroom Shower/Tub: Tub/Shower unit  Bathroom Toilet: Handicap height  Home Equipment: Cane  Has the patient had two or more falls in the past year or any fall with injury in the past year?: No  Receives Help From: Family  ADL Assistance: Independent  Homemaking Assistance:  (pt was indep with meals, daughter helpes with laundry)  Homemaking Responsibilities:  (pt's daughter performs grandson can also perform)  Ambulation Assistance: Independent  Transfer Assistance: Independent  Active : Yes (daughter drives her to appointments)  Vision/Hearing       Cognition   Orientation  Orientation Level: Oriented X4     Objective   Heart Rate: 78  Heart Rate Source: Monitor  BP: 93/67  BP Location: Left upper arm  BP Method: Automatic  Patient Position: Semi fowlers  MAP (Calculated): 75.67  Resp: 18  SpO2: 97 %  O2 Device: None (Room air)        Gross Assessment  AROM: Within functional limits  PROM: Within functional limits  Strength:  Within functional limits  Coordination: Within functional limits                    Bed mobility  Rolling to Right: Modified independent  Supine to Sit: Modified independent  Sit to Supine: Modified independent  Scooting: Modified independent  Transfers  Sit to Stand: Modified independent  Stand to Sit: Modified independent  Ambulation  Surface: Level tile  Device: No Device  Assistance: Supervision  Quality of Gait: Steady gait  Gait Deviations: Decreased step length;Decreased step height;Staggers  Distance: 100'  Comments: decreased endurance, some SOB with exertion     Balance  Sitting - Static: Good  Sitting - Dynamic: Good  Standing - Static: Good  Standing - Dynamic: Good           OutComes Score                                                  AM-PAC Score  AM-PAC Inpatient Mobility Raw Score : 23 (10/13/22 1137)  AM-PAC Inpatient T-Scale Score : 56.93 (10/13/22 1137)  Mobility Inpatient CMS 0-100% Score: 11.2 (10/13/22 1137)  Mobility Inpatient CMS G-Code Modifier : CI (10/13/22 1137)          Tinneti Score       Goals          Education  Patient Education  Education Given To: Patient; Family  Education Provided: Role of Therapy  Education Method: Demonstration  Barriers to Learning: None  Education Outcome: Verbalized understanding      Therapy Time   Individual Concurrent Group Co-treatment   Time In 1015         Time Out 1054         Minutes 39          Timed Code Treatment Minutes:   24    Total Treatment Minutes:  [address] [place] #01978

## 2022-10-14 VITALS
HEIGHT: 66 IN | WEIGHT: 162.4 LBS | TEMPERATURE: 97.4 F | BODY MASS INDEX: 26.1 KG/M2 | DIASTOLIC BLOOD PRESSURE: 82 MMHG | HEART RATE: 77 BPM | SYSTOLIC BLOOD PRESSURE: 111 MMHG | RESPIRATION RATE: 18 BRPM | OXYGEN SATURATION: 95 %

## 2022-10-14 LAB — PRO-BNP: 2185 PG/ML (ref 0–449)

## 2022-10-14 PROCEDURE — 83880 ASSAY OF NATRIURETIC PEPTIDE: CPT

## 2022-10-14 PROCEDURE — 2580000003 HC RX 258: Performed by: INTERNAL MEDICINE

## 2022-10-14 PROCEDURE — 6370000000 HC RX 637 (ALT 250 FOR IP): Performed by: NURSE PRACTITIONER

## 2022-10-14 PROCEDURE — 36415 COLL VENOUS BLD VENIPUNCTURE: CPT

## 2022-10-14 PROCEDURE — 6370000000 HC RX 637 (ALT 250 FOR IP): Performed by: HOSPITALIST

## 2022-10-14 RX ORDER — FUROSEMIDE 20 MG/1
20 TABLET ORAL 2 TIMES DAILY
Qty: 60 TABLET | Refills: 3 | Status: SHIPPED | OUTPATIENT
Start: 2022-10-14

## 2022-10-14 RX ORDER — METOPROLOL SUCCINATE 25 MG/1
25 TABLET, EXTENDED RELEASE ORAL DAILY
Qty: 30 TABLET | Refills: 3 | Status: SHIPPED | OUTPATIENT
Start: 2022-10-14

## 2022-10-14 RX ADMIN — ACETAMINOPHEN 650 MG: 325 TABLET, FILM COATED ORAL at 12:16

## 2022-10-14 RX ADMIN — METOPROLOL TARTRATE 25 MG: 25 TABLET, FILM COATED ORAL at 08:58

## 2022-10-14 RX ADMIN — SODIUM CHLORIDE, PRESERVATIVE FREE 10 ML: 5 INJECTION INTRAVENOUS at 08:58

## 2022-10-14 RX ADMIN — ASPIRIN 81 MG 81 MG: 81 TABLET ORAL at 08:58

## 2022-10-14 ASSESSMENT — PAIN DESCRIPTION - LOCATION: LOCATION: BACK

## 2022-10-14 ASSESSMENT — PAIN SCALES - GENERAL: PAINLEVEL_OUTOF10: 3

## 2022-10-14 NOTE — PROGRESS NOTES
[facility]  Cardiology Inpatient Consult Service  Daily Progress Note        Admit Date:  10/8/2022    Referring Physician: Yulissa Chen MD    Reason for Consultation/Chief Complaint: Hypoxic Respiratory Failure/Volume Overload/Possible Heart Failure    Subjective: Interval history:    Patient was seen this AM and reported an episode of discomfort when sleeping but denies any other acute complaints. She reports improvement of her SOB and did not experience any symptoms when ambulating to the washroom. HPI:    Patient seen this AM at bedside. She reports having an improvement in her SOB. She reports having SOB upon ambulating to the washroom but this has significantly improved. I/O undocumented however patient reports having increased urination. Ever Moore is a 78 y.o. female w/no significant past cardiac history who presented w/cc SOB. Her SOB was progressive, previously occured upon exertion and now upon rest. Patient additionally had a new oxygen requirement in addition to lower extremity edema and mild orthopnea. Patient was found to have BNP in 6000s and with possible bilateral pulmonary edema. She has since received Lasix IV.      Medications:   sodium chloride flush  5-40 mL IntraVENous 2 times per day    [Held by provider] sacubitril-valsartan  0.5 tablet Oral BID    metoprolol tartrate  25 mg Oral BID    aspirin  81 mg Oral Daily    sodium chloride flush  5-40 mL IntraVENous 2 times per day    enoxaparin  40 mg SubCUTAneous Daily       IV drips:   sodium chloride      sodium chloride         PRN:  sodium chloride flush, sodium chloride, potassium chloride **OR** potassium alternative oral replacement **OR** potassium chloride, magnesium sulfate, sodium chloride flush, sodium chloride, ondansetron **OR** ondansetron, polyethylene glycol, acetaminophen **OR** acetaminophen, perflutren lipid microspheres      Objective:     Vitals:    10/13/22 1600 10/13/22 2020 10/14/22 0441 10/14/22 0823   BP: 101/68 111/80 104/61 110/79   Pulse: 75 81 79 75   Resp: 18 16 16 17   Temp: 98 °F (36.7 °C) 98.4 °F (36.9 °C) 98.4 °F (36.9 °C) 97.9 °F (36.6 °C)   TempSrc: Oral Oral Axillary Oral   SpO2: 98% 94%  97%   Weight:       Height:           Intake/Output Summary (Last 24 hours) at 10/14/2022 1126  Last data filed at 10/14/2022 0858  Gross per 24 hour   Intake 130 ml   Output 600 ml   Net -470 ml     I/O last 3 completed shifts: In: 475 [P.O.:400; I.V.:337]  Out: 1100 [Urine:1100]  Wt Readings from Last 3 Encounters:   10/13/22 162 lb 6.4 oz (73.7 kg)   04/07/21 174 lb (78.9 kg)   02/12/21 177 lb (80.3 kg)       Admit Wt: Weight: 172 lb (78 kg)   Todays Wt: Weight: 162 lb 6.4 oz (73.7 kg)      Physical Exam:     General:  Awake, alert, NAD  Skin:  Warm and dry  Chest:  Clear to auscultation, respiration normal without any audible crackles  Cardiovascular:  RRR S1S2  Extremities:  no edema at bilateral lower extremities      Objective:  Vital signs: (most recent): Blood pressure 110/79, pulse 75, temperature 97.9 °F (36.6 °C), temperature source Oral, resp. rate 17, height 5' 6\" (1.676 m), weight 162 lb 6.4 oz (73.7 kg), SpO2 97 %, not currently breastfeeding. Labs:   Recent Labs     10/12/22  0651 10/13/22  0706    137   K 4.4 4.2   BUN 20 19   CREATININE 1.2 1.1    103   CO2 28 26   GLUCOSE 102* 99   CALCIUM 9.0 8.9   MG 2.40 2.30     No results for input(s): WBC, HGB, HCT, PLT, MCV in the last 72 hours. No results for input(s): CHOLTOT, TRIG, HDL, CHOLHDL, LDL in the last 72 hours. Invalid input(s): Kurt Quick  No results for input(s): PTT, INR in the last 72 hours. Invalid input(s): PT  No results for input(s): CKTOTAL, CKMB, CKMBINDEX, TROPONINI in the last 72 hours. No results for input(s): BNP in the last 72 hours. No results for input(s): NTPROBNP in the last 72 hours. No results for input(s): TSH in the last 72 hours.     Imaging:   I personally

## 2022-10-14 NOTE — PLAN OF CARE
Problem: Respiratory - Adult  Goal: Achieves optimal ventilation and oxygenation  Outcome: Completed     Problem: Discharge Planning  Goal: Discharge to home or other facility with appropriate resources  Outcome: Completed     Problem: Safety - Adult  Goal: Free from fall injury  10/14/2022 1454 by Nilesh Dillard RN  Outcome: Completed  10/14/2022 0328 by Amanda Farias RN  Outcome: Progressing     Problem: Cardiovascular - Adult  Goal: Maintains optimal cardiac output and hemodynamic stability  Outcome: Completed  Goal: Absence of cardiac dysrhythmias or at baseline  Outcome: Completed     Problem: Metabolic/Fluid and Electrolytes - Adult  Goal: Electrolytes maintained within normal limits  10/14/2022 1454 by Nilesh Dillrad RN  Outcome: Completed  10/14/2022 0328 by Amanda Farias RN  Outcome: Progressing  Goal: Hemodynamic stability and optimal renal function maintained  10/14/2022 1454 by Nilesh Dillard RN  Outcome: Completed  10/14/2022 0328 by Amanda Farias RN  Outcome: Progressing     Problem: Chronic Conditions and Co-morbidities  Goal: Patient's chronic conditions and co-morbidity symptoms are monitored and maintained or improved  Outcome: Completed     Problem: Skin/Tissue Integrity  Goal: Absence of new skin breakdown  Description: 1. Monitor for areas of redness and/or skin breakdown  2. Assess vascular access sites hourly  3. Every 4-6 hours minimum:  Change oxygen saturation probe site  4. Every 4-6 hours:  If on nasal continuous positive airway pressure, respiratory therapy assess nares and determine need for appliance change or resting period.   Outcome: Completed     Problem: Pain  Goal: Verbalizes/displays adequate comfort level or baseline comfort level  Outcome: Completed

## 2022-10-14 NOTE — CARE COORDINATION
Case Management Assessment            Discharge Note                    Date / Time of Note: 10/14/2022 10:04 AM                  Discharge Note Completed by: Fide Medina RN    Patient Name: Eulalia Farley   YOB: 1940  Diagnosis: Acute decompensated heart failure ([facility] [address] [I50.9]  Congestive heart failure, unspecified HF chronicity, unspecified heart failure type [facility]) [I50.9]   Date / Time: 10/8/2022 10:58 AM    Current PCP: Rocco Smith MD  Clinic patient: Yes    Hospitalization in the last 30 days: No    Advance Directives:  Code Status: Full Code  [place] DNR form completed and on chart: No    Financial:  Payor: Jamil Tavera / Plan: [place] PPO / Product Type: Medicare /      Pharmacy:    [address] [phone] - F [phone]  [address] [facility] [address]  Phone: [phone] Fax: [address], [address] [place] [phone]  Phone: [phone] Fax: [phone]      Assistance purchasing medications?: Potential Assistance Purchasing Medications: No  Assistance provided by Case Management: None at this time    Does patient want to participate in local refill/ meds to beds program?: Yes    Meds To Beds General Rules:  1. Can ONLY be done Monday- Friday between 8:30am-5pm  2. Prescription(s) must be in pharmacy by 3pm to be filled same day  3. Copy of patient's insurance/ prescription drug card and patient face sheet must be sent along with the prescription(s)  4. Cost of Rx cannot be added to hospital bill. If financial assistance is needed, please contact unit  or ;  or  CANNOT provide pharmacy voucher for patients co-pays  5.  Patients can then  the prescription on their way out of the hospital at discharge, or pharmacy can deliver to the bedside if staff is available. (payment due at time of pick-up or delivery - cash, check, or card accepted)     Able to afford home medications/ co-pay costs: Yes    ADLS:  Current PT AM-PAC Score: 23 /24  Current OT AM-PAC Score: 24 /24      DISCHARGE Disposition: Home- No Services Needed    LOC at discharge: Not Applicable  RUPERT Completed: No    Notification completed in HENS/PAS?:  Not Applicable    IMM Completed:   Yes, Case management has presented and reviewed IMM letter #2 to the patient and/or family/ POA. Patient and/or family/POA verbalized understanding of their medicare rights and appeal process if needed. Patient and/or family/POA has signed, initialed and placed today's date (10/14/84700488 ) and time ([phone] [id number]) on IMM letter #2 on the the appropriate lines. Patient and/or family/POA, copy of letter offered and they are aware that this original copy of IMM letter #2 is available prior to discharge from the paper chart on the unit. Electronic documentation has been entered into epic for IMM letter #2 and original paper copy has been added to the paper chart at the nurses station.      Transportation:  Transportation PLAN for discharge: family   Mode of Transport: Private Car  Reason for medical transport: Not Applicable  Name of [address] [place] [address],[address]: Not Applicable  Time of Transport: when  grandson available    Transport form completed: No    Home Care:  [address] ordered at discharge: No  [address]: Not Applicable  Orders faxed: No    Durable Medical Equipment:  DME Provider: NA  Equipment obtained during hospitalization: NA    Home Oxygen and Respiratory Equipment:  Oxygen needed at discharge?: No  [address]: Not Applicable  Portable tank available for discharge?: No    Dialysis:  Dialysis patient: No    Dialysis Center:  Not Applicable    Hospice Services:  Location: Not Applicable  Agency: Not Applicable    Consents signed: Not Indicated    Referrals made at [facility][1] for outpatient continued care:  Not Applicable    Additional CM Notes:     CM  confirmed  d/c home  w/  grandson later today :    Patient to follow up out pt  as  instructed:    Patient  declined  [facility]  services  :    New Rx:   JHK  Meds to [employer].  these medications from [facility], [facility] [address] Apoorva Castrejon [phone] - F [phone]  furosemide  metoprolol tartrate  sacubitril-valsartan    The Plan for Transition of Care is related to the following treatment goals of Acute decompensated heart failure ([address] [I50.9]  Congestive heart failure, unspecified HF chronicity, unspecified heart failure type ([address] [I50.9]    The Patient and/or patient representative Cheryl and her family were provided with a choice of provider and agrees with the discharge plan Yes    Freedom of choice list was provided with basic dialogue that supports the patient's individualized plan of care/goals and shares the quality data associated with the providers.  Yes    Care Transitions patient: No    Haroldo Mercedes RN  [facility]  Case Management Department  Ph: [phone]

## 2022-10-14 NOTE — PLAN OF CARE
Problem: Safety - Adult  Goal: Free from fall injury  Outcome: Progressing  Family at bedside     Problem: Metabolic/Fluid and Electrolytes - Adult  Goal: Electrolytes maintained within normal limits  Outcome: Progressing  Goal: Hemodynamic stability and optimal renal function maintained  Outcome: Progressing

## 2022-10-14 NOTE — DISCHARGE SUMMARY
Hospital Medicine Discharge Summary    Patient ID: Mick Mg      Patient's PCP: Óscar Butterfield MD    Admit Date: 10/8/2022     Discharge Date:   10/14/2022    Admitting Physician: Rosalva Crum MD     Discharge Physician: Lopez Koehler     Discharge Diagnoses: Active Hospital Problems    Diagnosis Date Noted    SVT (supraventricular tachycardia) ([facility] [address] [I47.1] 10/11/2022     Priority: Medium    NSVT (nonsustained ventricular tachycardia) [I47.29] 10/11/2022     Priority: Medium    Acute systolic heart failure ([facility] [address] [I50.21] 10/11/2022     Priority: Medium       The patient was seen and examined on day of discharge and this discharge summary is in conjunction with any daily progress note from day of discharge. Hospital Course: Ms Ginger Villasenor is an 79 yo who presented to the ED due to shortness of breath that had been intermittent for several weeks but had worsened in recent days both with exertion and at rest. She was tachypneic, satting 90% on room air, which improved following 2L oxygen via nasal cannula. CXR shwoed pulmonary edema and bilateral effusions, BNP was over 6000. She had no previous history of heart failure. IV lasix was started and she was admitted to the hospital for diuresis for fluid overload. On admission, she was continued on Lasix IV and diuresed well. Cardiology was consulted and work up was done. Echo done 10/11/2022 found reduced ejection fraction of 20% but LHC on 10/12/2022 found no CAD. Her blood pressures were soft throughout her admission so she was not started on all GDMT at once. She continued to diurese well and her shortness of breath improved. On discharge, she is satting well on room air and feels significantly better than admission. She has been started on metoprolol and entresto, will follow-up with cardiology regarding further GDMT.          Exam:     /79   Pulse 75   Temp 97.9 °F (36.6 °C) (Oral)   Resp 17   Ht 5' 6\" (1.676 m) Wt 162 lb 6.4 oz (73.7 kg)   SpO2 97%   BMI 26.21 kg/m²     General appearance: No apparent distress, appears stated age and cooperative. HEENT: Pupils equal, round, and reactive to light. Conjunctivae/corneas clear. Neck: Supple, with full range of motion. No jugular venous distention. Trachea midline. Respiratory:  Normal respiratory effort  Cardiovascular: Regular rate and rhythm with normal S1/S2 without murmurs, rubs or gallops. Abdomen: Soft, non-tender, non-distended with normal bowel sounds. Musculoskeletal: Trace edema bilaterally to ankle. No clubbing, cyanosis bilaterally. Full range of motion without deformity. Skin: Skin color, texture, turgor normal.  No rashes or lesions. Neurologic:  Neurovascularly intact without any focal sensory/motor deficits. Cranial nerves: II-XII intact, grossly non-focal.  Psychiatric: Alert and oriented, thought content appropriate, normal insight  Capillary Refill: Brisk, 3 seconds, normal   Peripheral Pulses: +2 palpable, equal bilaterally       Consults:     IP CONSULT TO HOSPITALIST  IP CONSULT TO HEART FAILURE NURSE/COORDINATOR  IP CONSULT TO DIETITIAN  IP CONSULT TO CARDIOLOGY    Significant Diagnostic Studies:       ECHO 10/11/2022   Left ventricular cavity size is normal. There is moderate concentric left ventricular hypertrophy. Overall left ventricular systolic function appears severely reduced with an ejection fraction of 20%. There is severe diffuse hypokinesis. Diastolic filling parameters suggest grade II diastolic dysfunction. Global strain is -4.1%. Moderate mitral regurgitation is present. Moderate tricuspid regurgitation. Estimated pulmonary artery systolic pressure is at 53 mmHg assuming a right atrial pressure of 3 mmHg. The right ventricle is enlarged and hypokinetic. TAPSE measures 1.63 cm and the RVS velocity measures 8.76 cm/s. The left atrium is mildly dilated.     [facility] 10/12/2022   No evidence of coronary artery disease, EDP 15    PCP/SNF to follow up: PRN    Disposition:  Home with grandson     Discharge Instructions/Follow-up:  Follow up with cardiology outpatient, take medications as directed below. Code Status:  Full Code     Activity: activity as tolerated    Diet: regular diet    Labs: For convenience and continuity at follow-up the following most recent labs are provided:      CBC:    Lab Results   Component Value Date/Time    WBC 5.3 10/08/2022 11:30 AM    HGB 11.3 10/08/2022 11:30 AM    HCT 33.6 10/08/2022 11:30 AM     10/08/2022 11:30 AM       Renal:    Lab Results   Component Value Date/Time     10/13/2022 07:06 AM    K 4.2 10/13/2022 07:06 AM    K 3.4 10/08/2022 11:30 AM     10/13/2022 07:06 AM    CO2 26 10/13/2022 07:06 AM    BUN 19 10/13/2022 07:06 AM    CREATININE 1.1 10/13/2022 07:06 AM    CALCIUM 8.9 10/13/2022 07:06 AM    PHOS 3.7 05/11/2011 01:35 PM       Discharge Medications:     Current Discharge Medication List             Details   metoprolol tartrate (LOPRESSOR) 25 MG tablet Take 0.5 tablets by mouth 2 times daily  Qty: 60 tablet, Refills: 3      sacubitril-valsartan (ENTRESTO) 24-26 MG per tablet Take 0.5 tablets by mouth 2 times daily  Qty: 60 tablet, Refills: 0      furosemide (LASIX) 20 MG tablet Take 1 tablet by mouth 2 times daily  Qty: 60 tablet, Refills: 3                Details   aspirin 81 MG chewable tablet Take 81 mg by mouth daily. Time Spent on discharge is more than 30 minutes in the examination, evaluation, counseling and review of medications and discharge plan. Signed:    Thierno Higgins   10/14/2022      Thank you Sandor Jackson MD for the opportunity to be involved in this patient's care. If you have any questions or concerns please feel free to contact me at [phone].

## 2022-10-17 ENCOUNTER — FOLLOWUP TELEPHONE ENCOUNTER (OUTPATIENT)
Dept: INPATIENT UNIT | Age: 82
End: 2022-10-17

## 2022-10-17 ENCOUNTER — CARE COORDINATION (OUTPATIENT)
Dept: CASE MANAGEMENT | Age: 82
End: 2022-10-17

## 2022-10-17 NOTE — PROGRESS NOTES
Attempted to reach patient for 72 hour  hospital follow up. Calls made at 1210 and [phone] went to . HIPAA compliant message left requesting call back. CTN also attempted to call pt earlier on this same date and also left a message. Pt has follow up arranged for 10/18 with cardiology.

## 2022-10-17 NOTE — CARE COORDINATION
[facility] Transitions Initial Follow Up Call    Call within 2 business days of discharge: Yes    Patient:  Tammi Molina Patient :  [id number]  MRN:  [id number]   Reason for Admission:   HF  Discharge Date:  10/14/22  RARS: 12      Transitions of Care Initial Call    Was this an external facility discharge? NO    Discharge Facility: [facility]      Challenges to be reviewed by the provider   Additional needs identified to be addressed with provider:    no    AMB CC Provider Discharge Needs: none          CTC attempt to reach Pt regarding recent hospital discharge. CTC left voice recording with call back number requesting a call back. Follow up appointments:    Future Appointments   Date Time Provider Donato Pillai   10/18/2022 11:00 AM JESSICA Sellers - CNP [facility] [place]       LATRICE Palomo, RN  Care Transition Coordinator  Contact Number:  [phone]

## 2022-10-18 ENCOUNTER — CARE COORDINATION (OUTPATIENT)
Dept: CASE MANAGEMENT | Age: 82
End: 2022-10-18

## 2022-10-21 ENCOUNTER — TELEPHONE (OUTPATIENT)
Dept: CARDIOLOGY CLINIC | Age: 82
End: 2022-10-21

## 2022-10-21 NOTE — TELEPHONE ENCOUNTER
Pt states she need to have a hospital f/u with Madiha Roland in 1 week and does not want to see a NP and would Karan Hoffman or his nurse to call her please call [phone]

## 2022-12-14 NOTE — PROGRESS NOTES
[facility]     Outpatient Cardiology         Patient Name:  Selin Barker  Requesting Physician: No admitting provider for patient encounter. Primary Care Physician: Tony Mclean MD    Reason for Consultation/Chief Complaint:   Chief Complaint   Patient presents with    Congestive Heart Failure     C/o HUTCHISON specially when walking         History of Present Illness:    HPI     Annabel Valdez a 78 y.o. female with PMH of HLD, HTN, CVA. Here for hospital follow up for new onset HFrEF and SVT. Presented to the ED with shortness of breath, placed on oxygen, BNP 6000. S/p cath on 10/8/22 that showed no evidence of coronary artery disease. HFrEF, nonischemic, doing better. Still having mild shortness of breath. Tolerating okay Lasix, Entresto, metoprolol. Today we discussed adding Jardiance. HTN, controlled on current medications      PMH  Past Medical History:   Diagnosis Date    Asymptomatic gallstones 04/21/2020    CHF (congestive heart failure) (HCC)     CVA (cerebral infarction) 2007    neg carotids, nl echocardiogram    Hyperlipidemia     Hypertension     Pulmonary nodule        PSH  Past Surgical History:   Procedure Laterality Date    KNEE ARTHROSCOPY          Social HIstory  Social History     Tobacco Use    Smoking status: Never    Smokeless tobacco: Never   Substance Use Topics    Alcohol use: No    Drug use: No       Family History  Family History   Problem Relation Age of Onset    High Blood Pressure Father     Stroke Father     Stroke Sister     Cancer Brother         lung    Cancer Brother         colon       Allergies   No Known Allergies    Medications:     Home Medications:  Were reviewed and are listed in nursing record. and/or listed below    Prior to Admission medications    Medication Sig Start Date End Date Taking?  Authorizing Provider   sacubitril-valsartan (ENTRESTO) 24-26 MG per tablet Take 0.5 tablets by mouth 2 times daily 10/14/22  William ARGUELLES Marisol Lopez MD   furosemide (LASIX) 20 MG tablet Take 1 tablet by mouth 2 times daily 10/14/22  Yes Leanna Swartz MD   metoprolol succinate (TOPROL XL) 25 MG extended release tablet Take 1 tablet by mouth daily 10/14/22  Yes Leanna Swartz MD   aspirin 81 MG chewable tablet Take 81 mg by mouth daily. Yes Historical Provider, MD        Review of Systems   Constitutional:  Negative for activity change, appetite change, diaphoresis, fatigue, fever and unexpected weight change. HENT:  Negative for congestion, facial swelling, mouth sores and nosebleeds. Eyes:  Negative for discharge and visual disturbance. Respiratory:  Positive for shortness of breath. Negative for cough, chest tightness and wheezing. Cardiovascular:  Negative for chest pain, palpitations and leg swelling. Gastrointestinal:  Negative for abdominal distention, abdominal pain, blood in stool and vomiting. Endocrine: Negative for cold intolerance, heat intolerance and polyuria. Genitourinary:  Negative for difficulty urinating, dysuria, frequency and hematuria. Musculoskeletal:  Negative for back pain, joint swelling, myalgias and neck pain. Skin:  Negative for color change, pallor and rash. Allergic/Immunologic: Negative for immunocompromised state. Neurological:  Negative for dizziness, syncope, weakness, light-headedness, numbness and headaches. Hematological:  Negative for adenopathy. Does not bruise/bleed easily. Psychiatric/Behavioral:  Negative for behavioral problems, confusion, decreased concentration and suicidal ideas. The patient is not nervous/anxious.       Vitals:    12/15/22 1049   BP: 110/70   Pulse: 98   SpO2: 95%    Weight: 166 lb (75.3 kg)       Vitals:    12/15/22 1049   BP: 110/70   Site: Left Upper Arm   Position: Sitting   Cuff Size: Medium Adult   Pulse: 98   SpO2: 95%   Weight: 166 lb (75.3 kg)   Height: 5' 4\" (1.626 m)       BP Readings from Last 3 Encounters:   12/15/22 110/70   10/14/22 111/82 04/07/21 129/86       Wt Readings from Last 3 Encounters:   12/15/22 166 lb (75.3 kg)   10/13/22 162 lb 6.4 oz (73.7 kg)   04/07/21 174 lb (78.9 kg)       Physical Exam  Constitutional:       General: She is not in acute distress. Appearance: She is well-developed. She is not diaphoretic. HENT:      Head: Normocephalic and atraumatic. Eyes:      Pupils: Pupils are equal, round, and reactive to light. Neck:      Thyroid: No thyromegaly. Vascular: No JVD. Cardiovascular:      Rate and Rhythm: Normal rate and regular rhythm. Chest Wall: PMI is not displaced. Heart sounds: Normal heart sounds, S1 normal and S2 normal. No murmur heard. No friction rub. No gallop. Pulmonary:      Effort: Pulmonary effort is normal. No respiratory distress. Breath sounds: Normal breath sounds. No stridor. No wheezing or rales. Chest:      Chest wall: No tenderness. Abdominal:      General: Bowel sounds are normal. There is no distension. Palpations: Abdomen is soft. Tenderness: There is no abdominal tenderness. There is no guarding or rebound. Musculoskeletal:         General: No tenderness. Normal range of motion. Cervical back: Normal range of motion. Lymphadenopathy:      Cervical: No cervical adenopathy. Skin:     General: Skin is warm and dry. Findings: No erythema or rash. Neurological:      Mental Status: She is alert and oriented to person, place, and time. Coordination: Coordination normal.   Psychiatric:         Behavior: Behavior normal.         Thought Content:  Thought content normal.         Judgment: Judgment normal.       Labs:       Lab Results   Component Value Date    WBC 5.3 10/08/2022    HGB 11.3 (L) 10/08/2022    HCT 33.6 (L) 10/08/2022    MCV 89.5 10/08/2022     10/08/2022     Lab Results   Component Value Date     10/13/2022    K 4.2 10/13/2022     10/13/2022    CO2 26 10/13/2022    BUN 19 10/13/2022    CREATININE 1.1 10/13/2022    GLUCOSE 99 10/13/2022    CALCIUM 8.9 10/13/2022    PROT 7.6 03/27/2020    LABALBU 3.9 03/27/2020    BILITOT 0.5 03/27/2020    ALKPHOS 90 03/27/2020    AST 17 03/27/2020    ALT 10 03/27/2020    LABGLOM 47 (A) 10/13/2022    GFRAA 57 (A) 10/13/2022    AGRATIO 1.1 03/27/2020    GLOB 3.7 03/27/2020         Lab Results   Component Value Date    CHOL 130 10/09/2022    CHOL 205 (H) 05/11/2011     Lab Results   Component Value Date    TRIG 74 10/09/2022    TRIG 90 05/11/2011     Lab Results   Component Value Date    HDL 45 10/09/2022    HDL 64 (H) 05/11/2011     Lab Results   Component Value Date    LDLCALC 70 10/09/2022    LDLCALC 123 (H) 05/11/2011     Lab Results   Component Value Date    LABVLDL 15 10/09/2022    LABVLDL 18 05/11/2011     No results found for: CHOLHDLRATIO    No results found for: INR, PROTIME    The ASCVD Risk score (Mineral DK, et al., 2019) failed to calculate for the following reasons: The 2019 ASCVD risk score is only valid for ages 36 to 78      Imaging:       Last ECG (if available, Personally interpreted):  Normal sinus rhythm, nonspecific ST-T changes    Last Monitor/Holter (if available):    Last Stress (if available):    Last Cath (if available): 10/8/22     Results:  Left ventricular pressure 15 mmHg  Aortic pressure 109/78 mmHg     Coronary anatomy:   The left main coronary artery is normal.      Left anterior descending artery is normal     Circumflex artery is normal.     The right coronary artery is a dominant vessel and normal.      Left ventriculogram was not performed. EDP was 15. Impression:  No evidence   coronary artery disease  LV gram not performed. EDP was 15 mm. Complications: none     Last TTE/NANI(if available): 10/11/22  Summary   Left ventricular cavity size is normal. There is moderate concentric left   ventricular hypertrophy. Overall left ventricular systolic function appears severely reduced with an   ejection fraction of 20%.    There is severe diffuse hypokinesis. Diastolic filling parameters suggest grade II diastolic dysfunction. Global strain is -4.1%. Moderate mitral regurgitation is present. Moderate tricuspid regurgitation. Estimated pulmonary artery systolic pressure is at 53 mmHg assuming a right   atrial pressure of 3 mmHg. The right ventricle is enlarged and hypokinetic. TAPSE measures 1.63 cm and   the RVS velocity measures 8.76 cm/s. The left atrium is mildly dilated. Last CMR  (if available):    Last Coronary Artery Calcium Score: Ankle-brachial index:    Carotid ultrasound screening:    Abdominal aortic aneurysm screening:       Assessment / Plan:     Hypertension  Controlled on Entresto metoprolol    Congestive heart failure (HCC)  Nonischemic. Doing well with current dose of Entresto, Lasix, Toprol. Will add Jardiance 10 mg. Check renal panel and BNP today. Follow up in 3 months. I had the opportunity to review the clinical symptoms and presentation of [address]. Patient's allergies and medications were reviewed and updated. Patient's past medical, surgical, social and family history were reviewed and updated. Patient's testing including laboratory, ECGs, monitor, imaging (TTE,CAM,CMR,cath) were reviewed. Tobacco use was discussed with the patient and educated on the negative effects. I have asked the patient to not utilize these agents. All questions and concerns were addressed to the patient/family. Alternatives to my treatment were discussed. The note was completed using EMR. Every effort wasmade to ensure accuracy; however, inadvertent computerized transcription errors may be present. Thank you for allowing me to [facility] or [address] [place] [address].  Letty Cilfford MD, [address] [facility] [address]

## 2022-12-15 ENCOUNTER — OFFICE VISIT (OUTPATIENT)
Dept: CARDIOLOGY CLINIC | Age: 82
End: 2022-12-15
Payer: MEDICARE

## 2022-12-15 VITALS
WEIGHT: 166 LBS | HEIGHT: 64 IN | HEART RATE: 98 BPM | SYSTOLIC BLOOD PRESSURE: 110 MMHG | DIASTOLIC BLOOD PRESSURE: 70 MMHG | BODY MASS INDEX: 28.34 KG/M2 | OXYGEN SATURATION: 95 %

## 2022-12-15 DIAGNOSIS — I10 PRIMARY HYPERTENSION: ICD-10-CM

## 2022-12-15 DIAGNOSIS — I50.21 ACUTE SYSTOLIC HEART FAILURE (HCC): ICD-10-CM

## 2022-12-15 DIAGNOSIS — I50.21 ACUTE SYSTOLIC HEART FAILURE (HCC): Primary | ICD-10-CM

## 2022-12-15 LAB
ALBUMIN SERPL-MCNC: 3.5 G/DL (ref 3.4–5)
ANION GAP SERPL CALCULATED.3IONS-SCNC: 12 MMOL/L (ref 3–16)
BUN BLDV-MCNC: 14 MG/DL (ref 7–20)
CALCIUM SERPL-MCNC: 9 MG/DL (ref 8.3–10.6)
CHLORIDE BLD-SCNC: 106 MMOL/L (ref 99–110)
CO2: 22 MMOL/L (ref 21–32)
CREAT SERPL-MCNC: 1.5 MG/DL (ref 0.6–1.2)
GFR SERPL CREATININE-BSD FRML MDRD: 34 ML/MIN/{1.73_M2}
GLUCOSE BLD-MCNC: 90 MG/DL (ref 70–99)
PHOSPHORUS: 3.6 MG/DL (ref 2.5–4.9)
POTASSIUM SERPL-SCNC: 4.1 MMOL/L (ref 3.5–5.1)
PRO-BNP: 4046 PG/ML (ref 0–449)
SODIUM BLD-SCNC: 140 MMOL/L (ref 136–145)

## 2022-12-15 PROCEDURE — 99213 OFFICE O/P EST LOW 20 MIN: CPT | Performed by: INTERNAL MEDICINE

## 2022-12-15 PROCEDURE — 1123F ACP DISCUSS/DSCN MKR DOCD: CPT | Performed by: INTERNAL MEDICINE

## 2022-12-15 PROCEDURE — 3074F SYST BP LT 130 MM HG: CPT | Performed by: INTERNAL MEDICINE

## 2022-12-15 PROCEDURE — 3078F DIAST BP <80 MM HG: CPT | Performed by: INTERNAL MEDICINE

## 2022-12-15 RX ORDER — METOPROLOL SUCCINATE 25 MG/1
25 TABLET, EXTENDED RELEASE ORAL DAILY
Qty: 90 TABLET | Refills: 3 | Status: SHIPPED | OUTPATIENT
Start: 2022-12-15

## 2022-12-15 RX ORDER — ASPIRIN 81 MG/1
81 TABLET, CHEWABLE ORAL DAILY
Qty: 90 TABLET | Refills: 3 | Status: CANCELLED | OUTPATIENT
Start: 2022-12-15

## 2022-12-15 RX ORDER — FUROSEMIDE 20 MG/1
20 TABLET ORAL 2 TIMES DAILY
Qty: 180 TABLET | Refills: 3 | Status: SHIPPED | OUTPATIENT
Start: 2022-12-15 | End: 2022-12-16

## 2022-12-15 ASSESSMENT — ENCOUNTER SYMPTOMS
SHORTNESS OF BREATH: 1
COLOR CHANGE: 0
CHEST TIGHTNESS: 0
EYE DISCHARGE: 0
ABDOMINAL DISTENTION: 0
BLOOD IN STOOL: 0
WHEEZING: 0
ABDOMINAL PAIN: 0
FACIAL SWELLING: 0
BACK PAIN: 0
VOMITING: 0
COUGH: 0

## 2022-12-15 NOTE — ASSESSMENT & PLAN NOTE
Nonischemic. Doing well with current dose of Entresto, Lasix, Toprol. Will add Jardiance 10 mg. Check renal panel and BNP today.

## 2022-12-16 ENCOUNTER — TELEPHONE (OUTPATIENT)
Dept: CARDIOLOGY CLINIC | Age: 82
End: 2022-12-16

## 2022-12-16 DIAGNOSIS — I50.21 ACUTE SYSTOLIC HEART FAILURE (HCC): Primary | ICD-10-CM

## 2022-12-16 DIAGNOSIS — I50.21 ACUTE SYSTOLIC HEART FAILURE (HCC): ICD-10-CM

## 2022-12-16 RX ORDER — FUROSEMIDE 20 MG/1
20 TABLET ORAL 2 TIMES DAILY
Qty: 180 TABLET | Refills: 3
Start: 2022-12-16

## 2022-12-16 NOTE — TELEPHONE ENCOUNTER
Patient called and stated she was confused about taking her water pills. She needed the specifics because she got the wrong information.  Patient wants a call back at [phone]

## 2023-03-13 ASSESSMENT — ENCOUNTER SYMPTOMS
ABDOMINAL DISTENTION: 0
FACIAL SWELLING: 0
ABDOMINAL PAIN: 0
BACK PAIN: 0
WHEEZING: 0
COUGH: 0
VOMITING: 0
CHEST TIGHTNESS: 0
EYE DISCHARGE: 0
COLOR CHANGE: 0
BLOOD IN STOOL: 0
SHORTNESS OF BREATH: 1

## 2023-03-13 NOTE — PROGRESS NOTES
[facility]     Outpatient Cardiology         Patient Name:  Michael Reyna  Requesting Physician: No admitting provider for patient encounter. Primary Care Physician: Yudy Abraham MD    Reason for Consultation/Chief Complaint:   Chief Complaint   Patient presents with    Congestive Heart Failure    Shortness of Breath         History of Present Illness:    HPI     Savannah Vincent a 78 y.o. female with PMH of HLD, HTN, CVA. Here for follow up for  HFrEF and SVT. S/p cath on 10/8/22 that showed no evidence of coronary artery disease. HFrEF, compensated, no edema, no PND, no orthopnea. Doing okay with Davide Sear and metoprolol. Blood work today is pending. Today we discussed repeating an echo. HTN, controlled on current medications  SVT, no recurrence, continue metoprolol    PMH  Past Medical History:   Diagnosis Date    Asymptomatic gallstones 04/21/2020    CHF (congestive heart failure) (HCC)     CVA (cerebral infarction) 2007    neg carotids, nl echocardiogram    Hyperlipidemia     Hypertension     Pulmonary nodule        PSH  Past Surgical History:   Procedure Laterality Date    KNEE ARTHROSCOPY          Social HIstory  Social History     Tobacco Use    Smoking status: Never    Smokeless tobacco: Never   Substance Use Topics    Alcohol use: No    Drug use: No       Family History  Family History   Problem Relation Age of Onset    High Blood Pressure Father     Stroke Father     Stroke Sister     Cancer Brother         lung    Cancer Brother         colon       Allergies   No Known Allergies    Medications:     Home Medications:  Were reviewed and are listed in nursing record. and/or listed below    Prior to Admission medications    Medication Sig Start Date End Date Taking?  Authorizing Provider   furosemide (LASIX) 20 MG tablet Take 1 tablet by mouth 2 times daily On Monday , Wednesday and Friday 12/16/22  Yes Ruslan Moreno MD   sacubitril-valsartan (ENTRESTO) 24-26 MG per tablet Take 0.5 tablets by mouth 2 times daily 12/15/22  Yes Meg Jack MD   metoprolol succinate (TOPROL XL) 25 MG extended release tablet Take 1 tablet by mouth daily 12/15/22  Yes Meg Jack MD   Handicap Placard MISC by Does not apply route Good for 1 year (12/15/2023) 12/15/22  Yes Meg Jack MD   empagliflozin (JARDIANCE) 10 MG tablet Take 1 tablet by mouth daily 12/15/22  Yes Meg Jack MD   aspirin 81 MG chewable tablet Take 81 mg by mouth daily. Patient not taking: Reported on 3/15/2023    Historical Provider, MD        Review of Systems   Constitutional:  Negative for activity change, appetite change, diaphoresis, fatigue, fever and unexpected weight change. HENT:  Negative for congestion, facial swelling, mouth sores and nosebleeds. Eyes:  Negative for discharge and visual disturbance. Respiratory:  Positive for shortness of breath. Negative for cough, chest tightness and wheezing. Cardiovascular:  Negative for chest pain, palpitations and leg swelling. Gastrointestinal:  Negative for abdominal distention, abdominal pain, blood in stool and vomiting. Endocrine: Negative for cold intolerance, heat intolerance and polyuria. Genitourinary:  Negative for difficulty urinating, dysuria, frequency and hematuria. Musculoskeletal:  Negative for back pain, joint swelling, myalgias and neck pain. Skin:  Negative for color change, pallor and rash. Allergic/Immunologic: Negative for immunocompromised state. Neurological:  Negative for dizziness, syncope, weakness, light-headedness, numbness and headaches. Hematological:  Negative for adenopathy. Does not bruise/bleed easily. Psychiatric/Behavioral:  Negative for behavioral problems, confusion, decreased concentration and suicidal ideas. The patient is not nervous/anxious.       Vitals:    03/15/23 1149   BP: 110/80   Pulse: 84    Weight: 157 lb 12.8 oz (71.6 kg)       Vitals: 03/15/23 1149   BP: 110/80   Site: Left Upper Arm   Position: Sitting   Cuff Size: Medium Adult   Pulse: 84   Weight: 157 lb 12.8 oz (71.6 kg)       BP Readings from Last 3 Encounters:   03/15/23 110/80   12/15/22 110/70   10/14/22 111/82       Wt Readings from Last 3 Encounters:   03/15/23 157 lb 12.8 oz (71.6 kg)   12/15/22 166 lb (75.3 kg)   10/13/22 162 lb 6.4 oz (73.7 kg)       Physical Exam  Constitutional:       General: She is not in acute distress. Appearance: She is well-developed. She is not diaphoretic. HENT:      Head: Normocephalic and atraumatic. Eyes:      Pupils: Pupils are equal, round, and reactive to light. Neck:      Thyroid: No thyromegaly. Vascular: No JVD. Cardiovascular:      Rate and Rhythm: Normal rate and regular rhythm. Chest Wall: PMI is not displaced. Heart sounds: Normal heart sounds, S1 normal and S2 normal. No murmur heard. No friction rub. No gallop. Pulmonary:      Effort: Pulmonary effort is normal. No respiratory distress. Breath sounds: Normal breath sounds. No stridor. No wheezing or rales. Chest:      Chest wall: No tenderness. Abdominal:      General: Bowel sounds are normal. There is no distension. Palpations: Abdomen is soft. Tenderness: There is no abdominal tenderness. There is no guarding or rebound. Musculoskeletal:         General: No tenderness. Normal range of motion. Cervical back: Normal range of motion. Lymphadenopathy:      Cervical: No cervical adenopathy. Skin:     General: Skin is warm and dry. Findings: No erythema or rash. Neurological:      Mental Status: She is alert and oriented to person, place, and time. Coordination: Coordination normal.   Psychiatric:         Behavior: Behavior normal.         Thought Content:  Thought content normal.         Judgment: Judgment normal.       Labs:       Lab Results   Component Value Date    WBC 5.3 10/08/2022    HGB 11.3 (L) 10/08/2022    HCT 33.6 (L) 10/08/2022    MCV 89.5 10/08/2022     10/08/2022     Lab Results   Component Value Date     12/15/2022    K 4.1 12/15/2022     12/15/2022    CO2 22 12/15/2022    BUN 14 12/15/2022    CREATININE 1.5 (H) 12/15/2022    GLUCOSE 90 12/15/2022    CALCIUM 9.0 12/15/2022    PROT 7.6 03/27/2020    LABALBU 3.5 12/15/2022    BILITOT 0.5 03/27/2020    ALKPHOS 90 03/27/2020    AST 17 03/27/2020    ALT 10 03/27/2020    LABGLOM 34 (A) 12/15/2022    GFRAA 57 (A) 10/13/2022    AGRATIO 1.1 03/27/2020    GLOB 3.7 03/27/2020         Lab Results   Component Value Date    CHOL 130 10/09/2022    CHOL 205 (H) 05/11/2011     Lab Results   Component Value Date    TRIG 74 10/09/2022    TRIG 90 05/11/2011     Lab Results   Component Value Date    HDL 45 10/09/2022    HDL 64 (H) 05/11/2011     Lab Results   Component Value Date    LDLCALC 70 10/09/2022    LDLCALC 123 (H) 05/11/2011     Lab Results   Component Value Date    LABVLDL 15 10/09/2022    LABVLDL 18 05/11/2011     No results found for: CHOLHDLRATIO    No results found for: INR, PROTIME    The ASCVD Risk score (Francois DK, et al., 2019) failed to calculate for the following reasons: The 2019 ASCVD risk score is only valid for ages 36 to 78      Imaging:       Last ECG (if available, Personally interpreted):  Normal sinus rhythm, nonspecific ST-T changes    Last Monitor/Holter (if available):    Last Stress (if available):    Last Cath (if available): 10/8/22     Results:  Left ventricular pressure 15 mmHg  Aortic pressure 109/78 mmHg     Coronary anatomy:   The left main coronary artery is normal.      Left anterior descending artery is normal     Circumflex artery is normal.     The right coronary artery is a dominant vessel and normal.      Left ventriculogram was not performed. EDP was 15. Impression:  No evidence   coronary artery disease  LV gram not performed. EDP was 15 mm.       Complications: none     Last TTE/NANI(if available): 10/11/22  Summary   Left ventricular cavity size is normal. There is moderate concentric left   ventricular hypertrophy. Overall left ventricular systolic function appears severely reduced with an   ejection fraction of 20%. There is severe diffuse hypokinesis. Diastolic filling parameters suggest grade II diastolic dysfunction. Global strain is -4.1%. Moderate mitral regurgitation is present. Moderate tricuspid regurgitation. Estimated pulmonary artery systolic pressure is at 53 mmHg assuming a right   atrial pressure of 3 mmHg. The right ventricle is enlarged and hypokinetic. TAPSE measures 1.63 cm and   the RVS velocity measures 8.76 cm/s. The left atrium is mildly dilated. Last CMR  (if available):    Last Coronary Artery Calcium Score: Ankle-brachial index:    Carotid ultrasound screening:    Abdominal aortic aneurysm screening:       Assessment / Plan:     Congestive heart failure (HCC)  Chronic systolic heart failure. Compensated, doing well with Jardiance, Entresto, metoprolol. Given known coronary disease, no need for aspirin. Renal panel and BMP today pending. Echo to reassess LV function. SVT (supraventricular tachycardia) (HCC)  No recurrence. Continue metoprolol    Hypertension  Controlled on metoprolol and Entresto    Follow up in 6 months. I had the opportunity to review the clinical symptoms and presentation of [address]. Patient's allergies and medications were reviewed and updated. Patient's past medical, surgical, social and family history were reviewed and updated. Patient's testing including laboratory, ECGs, monitor, imaging (TTE,NANI,CMR,cath) were reviewed. All questions and concerns were addressed to the patient/family. Alternatives to my treatment were discussed. The note was completed using EMR. Every effort wasmade to ensure accuracy; however, inadvertent computerized transcription errors may be present.     [facility] you [facility] allowing [facility] or [address] [place] [address] TESS Mccray MD, [facility]

## 2023-03-15 ENCOUNTER — OFFICE VISIT (OUTPATIENT)
Dept: CARDIOLOGY CLINIC | Age: 83
End: 2023-03-15
Payer: MEDICARE

## 2023-03-15 VITALS
WEIGHT: 157.8 LBS | HEART RATE: 84 BPM | DIASTOLIC BLOOD PRESSURE: 80 MMHG | SYSTOLIC BLOOD PRESSURE: 110 MMHG | BODY MASS INDEX: 27.09 KG/M2

## 2023-03-15 DIAGNOSIS — I50.21 ACUTE SYSTOLIC HEART FAILURE (HCC): Primary | ICD-10-CM

## 2023-03-15 DIAGNOSIS — I50.22 CHRONIC SYSTOLIC CONGESTIVE HEART FAILURE (HCC): ICD-10-CM

## 2023-03-15 DIAGNOSIS — I10 PRIMARY HYPERTENSION: ICD-10-CM

## 2023-03-15 DIAGNOSIS — I50.21 ACUTE SYSTOLIC HEART FAILURE (HCC): ICD-10-CM

## 2023-03-15 DIAGNOSIS — I47.1 SVT (SUPRAVENTRICULAR TACHYCARDIA) (HCC): ICD-10-CM

## 2023-03-15 LAB
ALBUMIN SERPL-MCNC: 3.4 G/DL (ref 3.4–5)
ANION GAP SERPL CALCULATED.3IONS-SCNC: 12 MMOL/L (ref 3–16)
BUN SERPL-MCNC: 15 MG/DL (ref 7–20)
CALCIUM SERPL-MCNC: 8.8 MG/DL (ref 8.3–10.6)
CHLORIDE SERPL-SCNC: 106 MMOL/L (ref 99–110)
CO2 SERPL-SCNC: 23 MMOL/L (ref 21–32)
CREAT SERPL-MCNC: 1.3 MG/DL (ref 0.6–1.2)
GFR SERPLBLD CREATININE-BSD FMLA CKD-EPI: 40 ML/MIN/{1.73_M2}
GLUCOSE SERPL-MCNC: 98 MG/DL (ref 70–99)
NT-PROBNP SERPL-MCNC: 2184 PG/ML (ref 0–449)
PHOSPHATE SERPL-MCNC: 3.8 MG/DL (ref 2.5–4.9)
POTASSIUM SERPL-SCNC: 4.1 MMOL/L (ref 3.5–5.1)
SODIUM SERPL-SCNC: 141 MMOL/L (ref 136–145)

## 2023-03-15 PROCEDURE — 3074F SYST BP LT 130 MM HG: CPT | Performed by: INTERNAL MEDICINE

## 2023-03-15 PROCEDURE — G8400 PT W/DXA NO RESULTS DOC: HCPCS | Performed by: INTERNAL MEDICINE

## 2023-03-15 PROCEDURE — 1036F TOBACCO NON-USER: CPT | Performed by: INTERNAL MEDICINE

## 2023-03-15 PROCEDURE — 1123F ACP DISCUSS/DSCN MKR DOCD: CPT | Performed by: INTERNAL MEDICINE

## 2023-03-15 PROCEDURE — G8417 CALC BMI ABV UP PARAM F/U: HCPCS | Performed by: INTERNAL MEDICINE

## 2023-03-15 PROCEDURE — 1090F PRES/ABSN URINE INCON ASSESS: CPT | Performed by: INTERNAL MEDICINE

## 2023-03-15 PROCEDURE — G8427 DOCREV CUR MEDS BY ELIG CLIN: HCPCS | Performed by: INTERNAL MEDICINE

## 2023-03-15 PROCEDURE — G8484 FLU IMMUNIZE NO ADMIN: HCPCS | Performed by: INTERNAL MEDICINE

## 2023-03-15 PROCEDURE — 99214 OFFICE O/P EST MOD 30 MIN: CPT | Performed by: INTERNAL MEDICINE

## 2023-03-15 PROCEDURE — 3079F DIAST BP 80-89 MM HG: CPT | Performed by: INTERNAL MEDICINE

## 2023-03-15 NOTE — ASSESSMENT & PLAN NOTE
Chronic systolic heart failure. Compensated, doing well with Jardiance, Entresto, metoprolol. Given known coronary disease, no need for aspirin. Renal panel and BMP today pending. Echo to reassess LV function.

## 2023-03-30 NOTE — PLAN OF CARE
Problem: Respiratory - Adult  Goal: Achieves optimal ventilation and oxygenation  10/11/2022 0218 by Laura Mandel RN  Outcome: Progressing     Problem: Safety - Adult  Goal: Free from fall injury  10/11/2022 1456 by Maddi Oliveira RN  Outcome: Progressing  Note: Bed in lowest position and alarm on for safety. Call light in reach.  Nonskid socks on  10/11/2022 0218 by Laura Mandel RN  Outcome: Progressing     Problem: Cardiovascular - Adult  Goal: Maintains optimal cardiac output and hemodynamic stability  10/11/2022 1456 by Maddi Oliveira RN  Outcome: Progressing  10/11/2022 0218 by Laura Mandel RN  Outcome: Progressing [FreeTextEntry1] : Patient here for a follow up. [de-identified] : no chest pain, no sob, no cough, no fever, no dizziness, no abdominal pain, no n/v/d/c/melena/brbpr/hematuria/dysuria\par states lost 6 pounds on home scale since starting ozempic

## 2023-04-21 ENCOUNTER — TELEPHONE (OUTPATIENT)
Dept: CARDIOLOGY CLINIC | Age: 83
End: 2023-04-21

## 2023-04-21 DIAGNOSIS — I50.21 ACUTE SYSTOLIC HEART FAILURE (HCC): ICD-10-CM

## 2023-04-21 RX ORDER — FUROSEMIDE 20 MG/1
20 TABLET ORAL 2 TIMES DAILY
Qty: 180 TABLET | Refills: 3 | Status: SHIPPED | OUTPATIENT
Start: 2023-04-21

## 2023-04-21 RX ORDER — METOPROLOL SUCCINATE 25 MG/1
25 TABLET, EXTENDED RELEASE ORAL DAILY
Qty: 90 TABLET | Refills: 3 | Status: SHIPPED | OUTPATIENT
Start: 2023-04-21

## 2023-04-21 NOTE — TELEPHONE ENCOUNTER
The patient called to request refills:    sacubitril-valsartan (ENTRESTO) 24-26 MG per tablet [[id number]]     Order Details  Dose: 0.5 tablet Route: Oral Frequency: 2 TIMES DAILY   Dispense Quantity: 90 tablet Refills: 3          Sig: Take 0.5 tablets by mouth 2 times daily     empagliflozin (JARDIANCE) 10 MG tablet [[id number]]     Order Details  Dose: 10 mg Route: Oral Frequency: DAILY   Dispense Quantity: 30 tablet Refills: 5      metoprolol succinate (TOPROL XL) 25 MG extended release tablet 90 tablet 3 12/15/2022     Sig - Route: Take 1 tablet by mouth daily - Oral    Sent to pharmacy as: Metoprolol Succinate ER 25 MG Oral Tablet Extended Release 24 Hour (Toprol XL)    Cosign for Ordering: Accepted by Germaine Pacheco MD on 12/15/2022 11:27 AM    E-Prescribing Status: Receipt confirmed by pharmacy (12/15/2022 11:09 AM EST)      furosemide (LASIX) 20 MG tablet [[id number]]     Order Details  Dose: 20 mg Route: Oral Frequency: 2 TIMES DAILY   Dispense Quantity: 180 tablet Refills: 3          Sig: Take 1 tablet by mouth 2 times daily On Monday , Wednesday and Friday   She currently takes her Lasix 1 tablet by mouth 2 times daily Monday, Wednesday, Friday but her bottle says take one tablet by mouth twice daily but she is taking it one tablet twice daily Monday,Wednesday, Friday. She is out of Metoprolol succinate and has missed about 3 doses. Sabrina , [address] [place] [address] [address] Leroy Cain [phone] - F [phone]   [address] [place] [address]   Phone:  [phone]  Fax:  [phone]    Last office visit: 3/15/23  Next office visit: 9/20/23  Last labs: 3/15/23

## 2024-07-24 ENCOUNTER — TELEPHONE (OUTPATIENT)
Dept: FAMILY MEDICINE CLINIC | Age: 84
End: 2024-07-24

## 2024-07-24 NOTE — TELEPHONE ENCOUNTER
Pt has not been seen by  since March 2020. She is looking to schedule an AWV with . Would he be open to seeing this pt or should she look for care elsewhere? Pt had scheduled for an AWV this year and twice in 2023 but has no showed each one.   No